# Patient Record
Sex: MALE | Race: OTHER | NOT HISPANIC OR LATINO | URBAN - METROPOLITAN AREA
[De-identification: names, ages, dates, MRNs, and addresses within clinical notes are randomized per-mention and may not be internally consistent; named-entity substitution may affect disease eponyms.]

---

## 2017-04-06 ENCOUNTER — OUTPATIENT (OUTPATIENT)
Dept: OUTPATIENT SERVICES | Facility: HOSPITAL | Age: 77
LOS: 1 days | Discharge: HOME | End: 2017-04-06

## 2017-06-27 DIAGNOSIS — E06.3 AUTOIMMUNE THYROIDITIS: ICD-10-CM

## 2017-06-27 DIAGNOSIS — E53.8 DEFICIENCY OF OTHER SPECIFIED B GROUP VITAMINS: ICD-10-CM

## 2017-06-27 DIAGNOSIS — D53.9 NUTRITIONAL ANEMIA, UNSPECIFIED: ICD-10-CM

## 2017-06-27 DIAGNOSIS — E55.9 VITAMIN D DEFICIENCY, UNSPECIFIED: ICD-10-CM

## 2017-10-20 ENCOUNTER — OUTPATIENT (OUTPATIENT)
Dept: OUTPATIENT SERVICES | Facility: HOSPITAL | Age: 77
LOS: 1 days | Discharge: HOME | End: 2017-10-20

## 2017-10-20 DIAGNOSIS — D61.818 OTHER PANCYTOPENIA: ICD-10-CM

## 2018-03-21 ENCOUNTER — INPATIENT (INPATIENT)
Facility: HOSPITAL | Age: 78
LOS: 4 days | Discharge: ANOTHER HOSPITAL | End: 2018-03-26
Attending: INTERNAL MEDICINE

## 2018-03-21 VITALS
TEMPERATURE: 102 F | SYSTOLIC BLOOD PRESSURE: 130 MMHG | RESPIRATION RATE: 18 BRPM | DIASTOLIC BLOOD PRESSURE: 63 MMHG | HEART RATE: 96 BPM

## 2018-03-21 DIAGNOSIS — Z96.652 PRESENCE OF LEFT ARTIFICIAL KNEE JOINT: Chronic | ICD-10-CM

## 2018-03-21 DIAGNOSIS — Z98.890 OTHER SPECIFIED POSTPROCEDURAL STATES: Chronic | ICD-10-CM

## 2018-03-21 LAB
ALBUMIN SERPL ELPH-MCNC: 2.9 G/DL — LOW (ref 3.5–5.2)
ALP SERPL-CCNC: 56 U/L — SIGNIFICANT CHANGE UP (ref 30–115)
ALT FLD-CCNC: 15 U/L — SIGNIFICANT CHANGE UP (ref 0–41)
ANION GAP SERPL CALC-SCNC: 11 MMOL/L — SIGNIFICANT CHANGE UP (ref 7–14)
ANISOCYTOSIS BLD QL: SIGNIFICANT CHANGE UP
APPEARANCE UR: CLEAR — SIGNIFICANT CHANGE UP
APTT BLD: 26.6 SEC — LOW (ref 27–39.2)
AST SERPL-CCNC: 15 U/L — SIGNIFICANT CHANGE UP (ref 0–41)
BACTERIA # UR AUTO: (no result) /HPF
BASE EXCESS BLDV CALC-SCNC: 4.2 MMOL/L — HIGH (ref -2–2)
BASOPHILS # BLD AUTO: 0 K/UL — SIGNIFICANT CHANGE UP (ref 0–0.2)
BASOPHILS NFR BLD AUTO: 0 % — SIGNIFICANT CHANGE UP (ref 0–1)
BILIRUB DIRECT SERPL-MCNC: 0.3 MG/DL — HIGH (ref 0–0.2)
BILIRUB INDIRECT FLD-MCNC: 0.6 MG/DL — SIGNIFICANT CHANGE UP (ref 0.2–1.2)
BILIRUB SERPL-MCNC: 0.9 MG/DL — SIGNIFICANT CHANGE UP (ref 0.2–1.2)
BILIRUB UR-MCNC: NEGATIVE — SIGNIFICANT CHANGE UP
BUN SERPL-MCNC: 13 MG/DL — SIGNIFICANT CHANGE UP (ref 10–20)
CALCIUM SERPL-MCNC: 7.9 MG/DL — LOW (ref 8.5–10.1)
CHLORIDE SERPL-SCNC: 100 MMOL/L — SIGNIFICANT CHANGE UP (ref 98–110)
CO2 SERPL-SCNC: 24 MMOL/L — SIGNIFICANT CHANGE UP (ref 17–32)
COLOR SPEC: YELLOW — SIGNIFICANT CHANGE UP
CREAT SERPL-MCNC: 0.7 MG/DL — SIGNIFICANT CHANGE UP (ref 0.7–1.5)
DIFF PNL FLD: NEGATIVE — SIGNIFICANT CHANGE UP
ELLIPTOCYTES BLD QL SMEAR: SLIGHT — SIGNIFICANT CHANGE UP
EOSINOPHIL # BLD AUTO: 0.01 K/UL — SIGNIFICANT CHANGE UP (ref 0–0.7)
EOSINOPHIL NFR BLD AUTO: 1.9 % — SIGNIFICANT CHANGE UP (ref 0–8)
GLUCOSE SERPL-MCNC: 100 MG/DL — SIGNIFICANT CHANGE UP (ref 70–110)
GLUCOSE UR QL: NEGATIVE — SIGNIFICANT CHANGE UP
HCO3 BLDV-SCNC: 27 MMOL/L — SIGNIFICANT CHANGE UP (ref 22–29)
HCT VFR BLD CALC: 24 % — LOW (ref 42–52)
HGB BLD-MCNC: 8.1 G/DL — LOW (ref 14–18)
HYPOCHROMIA BLD QL: SLIGHT — SIGNIFICANT CHANGE UP
IMM GRANULOCYTES NFR BLD AUTO: 0 % — LOW (ref 0.1–0.3)
INR BLD: 1.32 RATIO — HIGH (ref 0.65–1.3)
KETONES UR-MCNC: NEGATIVE — SIGNIFICANT CHANGE UP
LACTATE BLDV-MCNC: 1.1 MMOL/L — SIGNIFICANT CHANGE UP (ref 0.5–1.6)
LACTATE SERPL-SCNC: 1.1 MMOL/L — SIGNIFICANT CHANGE UP (ref 0.5–2.2)
LEUKOCYTE ESTERASE UR-ACNC: NEGATIVE — SIGNIFICANT CHANGE UP
LIDOCAIN IGE QN: 18 U/L — SIGNIFICANT CHANGE UP (ref 7–60)
LYMPHOCYTES # BLD AUTO: 0.42 K/UL — LOW (ref 1.2–3.4)
LYMPHOCYTES # BLD AUTO: 77.8 % — HIGH (ref 20.5–51.1)
MCHC RBC-ENTMCNC: 33.3 PG — HIGH (ref 27–31)
MCHC RBC-ENTMCNC: 33.8 G/DL — SIGNIFICANT CHANGE UP (ref 32–37)
MCV RBC AUTO: 98.8 FL — HIGH (ref 80–94)
MICROCYTES BLD QL: SLIGHT — SIGNIFICANT CHANGE UP
MONOCYTES # BLD AUTO: 0.03 K/UL — LOW (ref 0.1–0.6)
MONOCYTES NFR BLD AUTO: 5.6 % — SIGNIFICANT CHANGE UP (ref 1.7–9.3)
NEUTROPHILS # BLD AUTO: 0.08 K/UL — LOW (ref 1.4–6.5)
NEUTROPHILS NFR BLD AUTO: 14.7 % — LOW (ref 42.2–75.2)
NITRITE UR-MCNC: NEGATIVE — SIGNIFICANT CHANGE UP
NRBC # BLD: 0 /100 — SIGNIFICANT CHANGE UP (ref 0–0)
NRBC # BLD: 4 /100 WBCS — HIGH (ref 0–0)
PCO2 BLDV: 31 MMHG — LOW (ref 41–51)
PH BLDV: 7.54 — HIGH (ref 7.26–7.43)
PH UR: 6 — SIGNIFICANT CHANGE UP (ref 5–8)
PLAT MORPH BLD: SIGNIFICANT CHANGE UP
PLATELET # BLD AUTO: 203 K/UL — SIGNIFICANT CHANGE UP (ref 130–400)
PO2 BLDV: 26 MMHG — SIGNIFICANT CHANGE UP (ref 20–40)
POTASSIUM SERPL-MCNC: 3.8 MMOL/L — SIGNIFICANT CHANGE UP (ref 3.5–5)
POTASSIUM SERPL-SCNC: 3.8 MMOL/L — SIGNIFICANT CHANGE UP (ref 3.5–5)
PROT SERPL-MCNC: 5.4 G/DL — LOW (ref 6–8)
PROT UR-MCNC: 30
PROTHROM AB SERPL-ACNC: 14.3 SEC — HIGH (ref 9.95–12.87)
RBC # BLD: 2.43 M/UL — LOW (ref 4.7–6.1)
RBC # FLD: 19.1 % — HIGH (ref 11.5–14.5)
RBC BLD AUTO: (no result)
RBC CASTS # UR COMP ASSIST: SIGNIFICANT CHANGE UP /HPF
SAO2 % BLDV: 63 % — SIGNIFICANT CHANGE UP
SODIUM SERPL-SCNC: 135 MMOL/L — SIGNIFICANT CHANGE UP (ref 135–146)
SP GR SPEC: 1.02 — SIGNIFICANT CHANGE UP (ref 1.01–1.03)
UROBILINOGEN FLD QL: 0.2 — SIGNIFICANT CHANGE UP (ref 0.2–0.2)
VARIANT LYMPHS # BLD: 16 % — HIGH (ref 0–5)
WBC # BLD: 0.54 K/UL — CRITICAL LOW (ref 4.8–10.8)
WBC # FLD AUTO: 0.54 K/UL — CRITICAL LOW (ref 4.8–10.8)
WBC UR QL: SIGNIFICANT CHANGE UP /HPF

## 2018-03-21 RX ORDER — SORAFENIB 200 MG/1
0 TABLET, FILM COATED ORAL
Qty: 0 | Refills: 0 | COMMUNITY

## 2018-03-21 RX ORDER — PANTOPRAZOLE SODIUM 20 MG/1
0 TABLET, DELAYED RELEASE ORAL
Qty: 0 | Refills: 0 | COMMUNITY

## 2018-03-21 RX ORDER — ENOXAPARIN SODIUM 100 MG/ML
40 INJECTION SUBCUTANEOUS EVERY 24 HOURS
Qty: 0 | Refills: 0 | Status: DISCONTINUED | OUTPATIENT
Start: 2018-03-21 | End: 2018-03-26

## 2018-03-21 RX ORDER — SODIUM CHLORIDE 9 MG/ML
1000 INJECTION INTRAMUSCULAR; INTRAVENOUS; SUBCUTANEOUS
Qty: 0 | Refills: 0 | Status: DISCONTINUED | OUTPATIENT
Start: 2018-03-21 | End: 2018-03-22

## 2018-03-21 RX ORDER — SENNA PLUS 8.6 MG/1
2 TABLET ORAL AT BEDTIME
Qty: 0 | Refills: 0 | Status: DISCONTINUED | OUTPATIENT
Start: 2018-03-21 | End: 2018-03-25

## 2018-03-21 RX ORDER — CHOLECALCIFEROL (VITAMIN D3) 125 MCG
1000 CAPSULE ORAL DAILY
Qty: 0 | Refills: 0 | Status: DISCONTINUED | OUTPATIENT
Start: 2018-03-21 | End: 2018-03-26

## 2018-03-21 RX ORDER — DILTIAZEM HCL 120 MG
120 CAPSULE, EXT RELEASE 24 HR ORAL DAILY
Qty: 0 | Refills: 0 | Status: DISCONTINUED | OUTPATIENT
Start: 2018-03-21 | End: 2018-03-26

## 2018-03-21 RX ORDER — ACYCLOVIR SODIUM 500 MG
0 VIAL (EA) INTRAVENOUS
Qty: 0 | Refills: 0 | COMMUNITY

## 2018-03-21 RX ORDER — POSACONAZOLE 100 MG/1
3 TABLET, DELAYED RELEASE ORAL
Qty: 0 | Refills: 0 | COMMUNITY

## 2018-03-21 RX ORDER — CEFEPIME 1 G/1
2000 INJECTION, POWDER, FOR SOLUTION INTRAMUSCULAR; INTRAVENOUS EVERY 8 HOURS
Qty: 0 | Refills: 0 | Status: DISCONTINUED | OUTPATIENT
Start: 2018-03-21 | End: 2018-03-26

## 2018-03-21 RX ORDER — VANCOMYCIN HCL 1 G
1000 VIAL (EA) INTRAVENOUS ONCE
Qty: 0 | Refills: 0 | Status: COMPLETED | OUTPATIENT
Start: 2018-03-21 | End: 2018-03-21

## 2018-03-21 RX ORDER — ZOLPIDEM TARTRATE 10 MG/1
5 TABLET ORAL AT BEDTIME
Qty: 0 | Refills: 0 | Status: DISCONTINUED | OUTPATIENT
Start: 2018-03-21 | End: 2018-03-26

## 2018-03-21 RX ORDER — CABERGOLINE 0.5 MG/1
0 TABLET ORAL
Qty: 0 | Refills: 0 | COMMUNITY

## 2018-03-21 RX ORDER — FUROSEMIDE 40 MG
20 TABLET ORAL DAILY
Qty: 0 | Refills: 0 | Status: DISCONTINUED | OUTPATIENT
Start: 2018-03-21 | End: 2018-03-26

## 2018-03-21 RX ORDER — ACYCLOVIR SODIUM 500 MG
400 VIAL (EA) INTRAVENOUS
Qty: 0 | Refills: 0 | Status: DISCONTINUED | OUTPATIENT
Start: 2018-03-21 | End: 2018-03-26

## 2018-03-21 RX ORDER — SODIUM CHLORIDE 9 MG/ML
3 INJECTION INTRAMUSCULAR; INTRAVENOUS; SUBCUTANEOUS ONCE
Qty: 0 | Refills: 0 | Status: COMPLETED | OUTPATIENT
Start: 2018-03-21 | End: 2018-03-21

## 2018-03-21 RX ORDER — FUROSEMIDE 40 MG
0 TABLET ORAL
Qty: 0 | Refills: 0 | COMMUNITY

## 2018-03-21 RX ORDER — SORAFENIB 200 MG/1
200 TABLET, FILM COATED ORAL
Qty: 0 | Refills: 0 | Status: DISCONTINUED | OUTPATIENT
Start: 2018-03-21 | End: 2018-03-26

## 2018-03-21 RX ORDER — POSACONAZOLE 100 MG/1
300 TABLET, DELAYED RELEASE ORAL DAILY
Qty: 0 | Refills: 0 | Status: DISCONTINUED | OUTPATIENT
Start: 2018-03-21 | End: 2018-03-26

## 2018-03-21 RX ORDER — POSACONAZOLE 100 MG/1
0 TABLET, DELAYED RELEASE ORAL
Qty: 0 | Refills: 0 | COMMUNITY

## 2018-03-21 RX ORDER — PANTOPRAZOLE SODIUM 20 MG/1
40 TABLET, DELAYED RELEASE ORAL
Qty: 0 | Refills: 0 | Status: DISCONTINUED | OUTPATIENT
Start: 2018-03-21 | End: 2018-03-26

## 2018-03-21 RX ORDER — DOCUSATE SODIUM 100 MG
100 CAPSULE ORAL
Qty: 0 | Refills: 0 | Status: DISCONTINUED | OUTPATIENT
Start: 2018-03-21 | End: 2018-03-26

## 2018-03-21 RX ORDER — VANCOMYCIN HCL 1 G
1250 VIAL (EA) INTRAVENOUS EVERY 12 HOURS
Qty: 0 | Refills: 0 | Status: DISCONTINUED | OUTPATIENT
Start: 2018-03-21 | End: 2018-03-22

## 2018-03-21 RX ORDER — AZACITIDINE FOR 100 MG/1
0 INJECTION, POWDER, LYOPHILIZED, FOR SOLUTION INTRAVENOUS; SUBCUTANEOUS
Qty: 0 | Refills: 0 | COMMUNITY

## 2018-03-21 RX ORDER — CEFEPIME 1 G/1
1000 INJECTION, POWDER, FOR SOLUTION INTRAMUSCULAR; INTRAVENOUS ONCE
Qty: 0 | Refills: 0 | Status: COMPLETED | OUTPATIENT
Start: 2018-03-21 | End: 2018-03-21

## 2018-03-21 RX ADMIN — Medication 250 MILLIGRAM(S): at 16:30

## 2018-03-21 RX ADMIN — CEFEPIME 100 MILLIGRAM(S): 1 INJECTION, POWDER, FOR SOLUTION INTRAMUSCULAR; INTRAVENOUS at 17:00

## 2018-03-21 RX ADMIN — SODIUM CHLORIDE 100 MILLILITER(S): 9 INJECTION INTRAMUSCULAR; INTRAVENOUS; SUBCUTANEOUS at 17:10

## 2018-03-21 RX ADMIN — SODIUM CHLORIDE 3 MILLILITER(S): 9 INJECTION INTRAMUSCULAR; INTRAVENOUS; SUBCUTANEOUS at 17:00

## 2018-03-21 NOTE — ED ADULT NURSE NOTE - PMH
Afib    AML (acute myeloblastic leukemia)    CAD (coronary artery disease)    CHF (congestive heart failure)    HTN (hypertension)

## 2018-03-21 NOTE — ED PROVIDER NOTE - CARE PLAN
Principal Discharge DX:	Neutropenic fever  Secondary Diagnosis:	Acute myeloid leukemia not having achieved remission

## 2018-03-21 NOTE — H&P ADULT - NSHPREVIEWOFSYSTEMS_GEN_ALL_CORE
Review of Systems:   CONSTITUTIONAL: + fever, malaise, decreased appetite  EYES: No eye pain, visual disturbances, or discharge  ENMT:  No difficulty hearing, tinnitus, vertigo; No sinus or throat pain  NECK: No pain or stiffness  RESPIRATORY: No cough, wheezing, chills or hemoptysis; No shortness of breath  CARDIOVASCULAR: No chest pain, palpitations, dizziness, or leg swelling  GASTROINTESTINAL: No abdominal or epigastric pain. No nausea, vomiting, or hematemesis; + constipation  GENITOURINARY: No dysuria  NEUROLOGICAL: No headaches  SKIN: + PICC line

## 2018-03-21 NOTE — ED PROVIDER NOTE - PROGRESS NOTE DETAILS
Patient has AML, neutropenia and presented with fever.  He was transferred to  area to [facility], care transferred to Dr Bryant. signed out to dr. de jesus pending labs, treatment for neutropenic fever, fu with oncology team here nad patietns oncologist.

## 2018-03-21 NOTE — H&P ADULT - NSHPLABSRESULTS_GEN_ALL_CORE
T(C): 38 (18 @ 22:29), Max: 38.8 (18 @ 14:11)  T(F): 100.4 (18 @ 22:29), Max: 101.9 (18 @ 14:11)  HR: 74 (18 @ 22:29) (74 - 96)  BP: 132/63 (18 @ 22:29) (126/70 - 136/66)  RR: 18 (18 @ 22:29) (17 - 18)  SpO2: 97% (18 @ 19:50) (97% - 99%)  Labs:                                8.1<L>  0.54<LL>   )-----------(   203      ( 21 Mar 2018 16:03 )                     24.0<L>    Neutro%  14.7<L>   Lympho%  77.8<H>   Mono%    5.6     Bands    x            135  |  100  |  13  ----------------------------<  100  3.8   |  24  |  0.7    Ca    7.9<L>      21 Mar 2018 16:03    TPro  5.4<L>  /  Alb  2.9<L>  /  TBili  0.9  /  DBili  0.3<H>  /  AST  15  /  ALT  15  /  AlkPhos  56      eGFR if : 107 mL/min/1.73M2 (18 @ 16:03)  eGFR if Non African American: 92 mL/min/1.73M2 (18 @ 16:03)      ( 21 Mar 2018 16:03 )   PT: 14.30 sec;   INR: 1.32 ratio;       PTT:26.6 sec    Urinalysis Basic - ( 21 Mar 2018 16:03 )    Color: Yellow / Appearance: Clear / S.020 / pH: x  Gluc: x / Ketone: Negative  / Bili: Negative / Urobili: 0.2   Blood: x / Protein: 30 / Nitrite: Negative   Leuk Esterase: Negative / RBC: 1-2 /HPF / WBC 1-2 /HPF   Sq Epi: x / Non Sq Epi: x / Bacteria: Few /HPF      Venous Blood Gas:   @ 17:22  7.54/31///  VBG Lactate: 1.1    CXR: f/u official read  - no evidence of acute pathology

## 2018-03-21 NOTE — ED PROVIDER NOTE - MEDICAL DECISION MAKING DETAILS
hx of aml with chemo every 28 days for 7 days (victoza) and takes cerofenil daily. Oncologist at [facility], has picc line since oct. today went to clinic to have picc line pulled, had elevated temperature, found to be febrile, then resolved, febrile again so came here. he does have some constipation, but no abd pain, no vomiting, no other symptoms. on exam well appearing abd soft, right arm with picc line, plan is to evaluate for sepsis, blood work done today shows wbc of 0.9 making him neutropenic. Patient has neutropenic fever today so will admit, start on abx and discus with oncology here.

## 2018-03-21 NOTE — ED PROVIDER NOTE - PHYSICAL EXAMINATION
General: Patient sitting up in bed, mild distress  Resp: Lung sounds clear to auscultation  CVS: RRR, S1 S2 no murmurs noted  Abd: Soft, NT/ND  Extrems: warm, no edema  Skin: no wounds or ulcers. RUE PICC site c/d/i

## 2018-03-21 NOTE — H&P ADULT - ASSESSMENT
# neutropenic fever and history of AML on chemo  - c/w Vanco 1250mg q12 and cefepime 2g IV q8  - f/u blood culture  - consider taking out PICC line  - c/w sorafenib 200mg q12  - follow up Heme/onc and ID   - please obtain records from oncologist's office    # h/o Benign pituitary tumor  - c/w cabergoline 0.5mg qD    # CAD s/p stent  - per pt's wife, not on ASA, plavix and metoprolol    # A fib s/p ablation  - not on AC  - c/w Cardizem 120mg qD    # HFpEF (stable)  - wife will bring entresto tomorrow, please enter correct dosage and order medication  - c/w lasix 20mg qd    # Constipation  - c/w senna, colace  - on protonix 77 M with PMHx as listed presents for fever of 103 at home.    # neutropenic fever and history of AML on chemo  - c/w Vanco 1250mg q12 and cefepime 2g IV q8  - f/u blood culture  - consider taking out PICC line  - c/w sorafenib 200mg q12  - follow up Heme/onc and ID   - please obtain records from oncologist's office    # h/o Benign pituitary tumor  - c/w cabergoline 0.5mg qD    # CAD s/p stent  - per pt's wife, not on ASA, plavix and metoprolol    # A fib s/p ablation  - not on AC  - c/w Cardizem 120mg qD    # HFpEF (stable)  - wife will bring entresto tomorrow, please enter correct dosage and order medication  - c/w lasix 20mg qd    # Constipation  - c/w senna, colace  - on protonix

## 2018-03-21 NOTE — ED PROVIDER NOTE - OBJECTIVE STATEMENT
Albertina is a 78 y/o male w/ pmhx of AML on chemo (Victoza every 28 days for 7 days, cerofenil daily) care at [facility], current PICC line, past sepsis due to gut bacteria translocation, who presents to the ED for evaluation of fever. As per patient, went to clinic today for routine blood work. Was found to have fever so referred to ED. Has felt generalized malaise for past few days. + constipation. No other focal symptoms; denies cough, dysuria, abdominal pain, nausea/vomiting/diarrhea, pain/swelling around site of PICC line, or any other complaints.

## 2018-03-21 NOTE — H&P ADULT - NSHPPHYSICALEXAM_GEN_ALL_CORE
GENERAL: NAD, well-developed  HEAD:  Atraumatic, Normocephalic  EYES: EOMI, PERRLA, conjunctiva and sclera clear  NECK: Supple, No JVD  CHEST/LUNG: Clear to auscultation bilaterally; No wheeze  HEART: Regular rate and rhythm; No murmurs, rubs, or gallops  ABDOMEN: Soft, Nontender, Nondistended; Bowel sounds present  EXTREMITIES:  trace leg edema.   PSYCH: AAOx3  NEUROLOGY: non-focal  SKIN: PICC line c/d/i

## 2018-03-21 NOTE — H&P ADULT - ATTENDING COMMENTS
76 yo M with PMH of AML diagnosed Oct 2017 currently on chemotherapy, benign pituitary tumor, CAD s/p Stent (Jan 2017), CHF, a fib s/p ablation presents to the ED for fever of 103 at home. Pt has been having general malaise, decreased appetite and constipation for the past week. he denies, chest pain, cough, throat pain, headache, nausea, vomiting or diarrhea, no urinary symptoms. In the ED he was febrile, labs showed neuropenia ANC 80. CXR didn't show any consolidation or infiltrates, UA negative. patient admitted to the hospital for neutropenic fever.     Physical exam:   Alert, oriented x3, in no distress  HEENT: PERRLA, dry nasal mucosa with crusted mucosa.   Neck: no JVD, no LAD  Chest; clear, no rales or wheezing  Heart: RRR s1 s2, no murmurs  Abdomen: soft, non-tender. no edema  Ext; RUE PICC line in place, no signs of erythema or infection, LE normal, pulses are ok b/l.     A/P:   Neutropenic fever: from   AML: s/p recent chemotherapy.   Start on Vancomycin and Cefepime IV, send blood culture from peripheral veins and PICC line.   ID consult. Tylenol prn.   Oncology consult, Hold Sorafenib for now  Contact his Oncologist at [facility] for further information.     CAD: stable s/p PCI  continue with ASA, Plavix and Metoprolol . Not on Statin, will discuss with the patient.

## 2018-03-21 NOTE — ED ADULT NURSE NOTE - OBJECTIVE STATEMENT
Patient was found to be febrile at [facility] were patient was having routine labs and PICC line dressing done. Patient was found to be febrile at [facility] were patient was having routine labs and PICC line dressing done. Patient found to be febrile at 102 oral at home. Patient has no other complaints at this time.

## 2018-03-21 NOTE — H&P ADULT - PMH
Afib    AML (acute myeloblastic leukemia)    CAD (coronary artery disease)    CHF (congestive heart failure)    HTN (hypertension) Afib    AML (acute myeloblastic leukemia)    CAD (coronary artery disease)    CHF (congestive heart failure)    HTN (hypertension)    Pituitary tumor  benign

## 2018-03-21 NOTE — H&P ADULT - HISTORY OF PRESENT ILLNESS
T max of 101.9 in ED 78 yo M with PMHx of 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 Pt was at [facility] clinic for routine blood draw the day of presentation, was found to have temp of 100.9 initially and then normalized to 98, so the clinic did not send pt to the ED. Pt went home and retook temperature which showed Temp of 103, so pt decided to come to the hospital. Pt has been having general malaise, decreased appetite and constipation for the past week. Pt had a history of sepsis 2/2 gut bacteria translocation one month after he was diagnosed with AML and pt had prolonged constipation at that time.   Pt had a bowel movement in the ED. Denies SOB, CP, cough, abdominal pain,  symptoms, leg edema.     Pt currently has a PICC line for blood and chemo infusion. site looks clean/dry/ intact.   T max of 101.9 in ED, was given IV Vancomycin 1000mg and cefepime 1000mg X1. 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 Pt was at [facility] clinic for routine blood draw the day of presentation, was found to have temp of 100.9 initially and then normalized to 98, so the clinic did not send pt to the ED. Pt went home and retook temperature which showed Temp of 103, so pt decided to come to the hospital. Pt has been having general malaise, decreased appetite and constipation for the past week. Per pt, he had sepsis 2/2 gut bacteria translocation one month after he was diagnosed with AML and he prolonged constipation at that time.   Pt had a bowel movement in the ED. Denies SOB, CP, cough, abdominal pain,  symptoms, leg edema.     Pt currently has a PICC line for blood and chemo infusion. site looks clean/dry/ intact.   T max of 101.9 in ED, was given IV Vancomycin 1000mg and cefepime 1000mg X1.

## 2018-03-22 LAB
-  STREPTOCOCCUS SP. (NOT GRP A, B OR S PNEUMONIAE): SIGNIFICANT CHANGE UP
ANION GAP SERPL CALC-SCNC: 10 MMOL/L — SIGNIFICANT CHANGE UP (ref 7–14)
BASOPHILS # BLD AUTO: 0 K/UL — SIGNIFICANT CHANGE UP (ref 0–0.2)
BASOPHILS NFR BLD AUTO: 0 % — SIGNIFICANT CHANGE UP (ref 0–1)
BUN SERPL-MCNC: 9 MG/DL — LOW (ref 10–20)
CALCIUM SERPL-MCNC: 7.6 MG/DL — LOW (ref 8.5–10.1)
CHLORIDE SERPL-SCNC: 100 MMOL/L — SIGNIFICANT CHANGE UP (ref 98–110)
CO2 SERPL-SCNC: 23 MMOL/L — SIGNIFICANT CHANGE UP (ref 17–32)
CREAT SERPL-MCNC: 0.6 MG/DL — LOW (ref 0.7–1.5)
CULTURE RESULTS: NO GROWTH — SIGNIFICANT CHANGE UP
EOSINOPHIL # BLD AUTO: 0 K/UL — SIGNIFICANT CHANGE UP (ref 0–0.7)
EOSINOPHIL NFR BLD AUTO: 0 % — SIGNIFICANT CHANGE UP (ref 0–8)
GLUCOSE SERPL-MCNC: 94 MG/DL — SIGNIFICANT CHANGE UP (ref 70–110)
GRAM STN FLD: SIGNIFICANT CHANGE UP
GRAM STN FLD: SIGNIFICANT CHANGE UP
HCT VFR BLD CALC: 22.3 % — LOW (ref 42–52)
HGB BLD-MCNC: 7.6 G/DL — LOW (ref 14–18)
IMM GRANULOCYTES NFR BLD AUTO: 0 % — LOW (ref 0.1–0.3)
LYMPHOCYTES # BLD AUTO: 0.5 K/UL — LOW (ref 1.2–3.4)
LYMPHOCYTES # BLD AUTO: 80.6 % — HIGH (ref 20.5–51.1)
MCHC RBC-ENTMCNC: 33.5 PG — HIGH (ref 27–31)
MCHC RBC-ENTMCNC: 34.1 G/DL — SIGNIFICANT CHANGE UP (ref 32–37)
MCV RBC AUTO: 98.2 FL — HIGH (ref 80–94)
METHOD TYPE: SIGNIFICANT CHANGE UP
MONOCYTES # BLD AUTO: 0.01 K/UL — LOW (ref 0.1–0.6)
MONOCYTES NFR BLD AUTO: 1.6 % — LOW (ref 1.7–9.3)
NEUTROPHILS # BLD AUTO: 0.11 K/UL — LOW (ref 1.4–6.5)
NEUTROPHILS NFR BLD AUTO: 17.8 % — LOW (ref 42.2–75.2)
PLATELET # BLD AUTO: 204 K/UL — SIGNIFICANT CHANGE UP (ref 130–400)
POTASSIUM SERPL-MCNC: 3.6 MMOL/L — SIGNIFICANT CHANGE UP (ref 3.5–5)
POTASSIUM SERPL-SCNC: 3.6 MMOL/L — SIGNIFICANT CHANGE UP (ref 3.5–5)
RBC # BLD: 2.27 M/UL — LOW (ref 4.7–6.1)
RBC # FLD: 19.1 % — HIGH (ref 11.5–14.5)
SODIUM SERPL-SCNC: 133 MMOL/L — LOW (ref 135–146)
SPECIMEN SOURCE: SIGNIFICANT CHANGE UP
SPECIMEN SOURCE: SIGNIFICANT CHANGE UP
WBC # BLD: 0.62 K/UL — CRITICAL LOW (ref 4.8–10.8)
WBC # FLD AUTO: 0.62 K/UL — CRITICAL LOW (ref 4.8–10.8)

## 2018-03-22 RX ORDER — ACETAMINOPHEN 500 MG
650 TABLET ORAL EVERY 6 HOURS
Qty: 0 | Refills: 0 | Status: DISCONTINUED | OUTPATIENT
Start: 2018-03-22 | End: 2018-03-26

## 2018-03-22 RX ORDER — ATORVASTATIN CALCIUM 80 MG/1
40 TABLET, FILM COATED ORAL AT BEDTIME
Qty: 0 | Refills: 0 | Status: DISCONTINUED | OUTPATIENT
Start: 2018-03-22 | End: 2018-03-26

## 2018-03-22 RX ORDER — DAPTOMYCIN 500 MG/10ML
900 INJECTION, POWDER, LYOPHILIZED, FOR SOLUTION INTRAVENOUS DAILY
Qty: 0 | Refills: 0 | Status: COMPLETED | OUTPATIENT
Start: 2018-03-23 | End: 2018-03-23

## 2018-03-22 RX ORDER — DAPTOMYCIN 500 MG/10ML
900 INJECTION, POWDER, LYOPHILIZED, FOR SOLUTION INTRAVENOUS ONCE
Qty: 0 | Refills: 0 | Status: COMPLETED | OUTPATIENT
Start: 2018-03-22 | End: 2018-03-22

## 2018-03-22 RX ORDER — CABERGOLINE 0.5 MG/1
0.5 TABLET ORAL DAILY
Qty: 0 | Refills: 0 | Status: DISCONTINUED | OUTPATIENT
Start: 2018-03-22 | End: 2018-03-26

## 2018-03-22 RX ORDER — SACUBITRIL AND VALSARTAN 24; 26 MG/1; MG/1
1 TABLET, FILM COATED ORAL EVERY 12 HOURS
Qty: 0 | Refills: 0 | Status: DISCONTINUED | OUTPATIENT
Start: 2018-03-22 | End: 2018-03-26

## 2018-03-22 RX ADMIN — ZOLPIDEM TARTRATE 5 MILLIGRAM(S): 10 TABLET ORAL at 23:03

## 2018-03-22 RX ADMIN — CEFEPIME 100 MILLIGRAM(S): 1 INJECTION, POWDER, FOR SOLUTION INTRAMUSCULAR; INTRAVENOUS at 21:35

## 2018-03-22 RX ADMIN — Medication 100 MILLIGRAM(S): at 05:20

## 2018-03-22 RX ADMIN — Medication 166.67 MILLIGRAM(S): at 05:19

## 2018-03-22 RX ADMIN — Medication 1000 UNIT(S): at 11:35

## 2018-03-22 RX ADMIN — DAPTOMYCIN 236 MILLIGRAM(S): 500 INJECTION, POWDER, LYOPHILIZED, FOR SOLUTION INTRAVENOUS at 16:52

## 2018-03-22 RX ADMIN — SACUBITRIL AND VALSARTAN 1 TABLET(S): 24; 26 TABLET, FILM COATED ORAL at 21:40

## 2018-03-22 RX ADMIN — SORAFENIB 200 MILLIGRAM(S): 200 TABLET, FILM COATED ORAL at 21:42

## 2018-03-22 RX ADMIN — Medication 100 MILLIGRAM(S): at 18:34

## 2018-03-22 RX ADMIN — ATORVASTATIN CALCIUM 40 MILLIGRAM(S): 80 TABLET, FILM COATED ORAL at 21:37

## 2018-03-22 RX ADMIN — Medication 650 MILLIGRAM(S): at 13:50

## 2018-03-22 RX ADMIN — Medication 120 MILLIGRAM(S): at 05:21

## 2018-03-22 RX ADMIN — PANTOPRAZOLE SODIUM 40 MILLIGRAM(S): 20 TABLET, DELAYED RELEASE ORAL at 05:23

## 2018-03-22 RX ADMIN — Medication 650 MILLIGRAM(S): at 19:31

## 2018-03-22 RX ADMIN — ZOLPIDEM TARTRATE 5 MILLIGRAM(S): 10 TABLET ORAL at 00:15

## 2018-03-22 RX ADMIN — SENNA PLUS 2 TABLET(S): 8.6 TABLET ORAL at 21:37

## 2018-03-22 RX ADMIN — ENOXAPARIN SODIUM 40 MILLIGRAM(S): 100 INJECTION SUBCUTANEOUS at 11:35

## 2018-03-22 RX ADMIN — CABERGOLINE 0.5 MILLIGRAM(S): 0.5 TABLET ORAL at 21:39

## 2018-03-22 RX ADMIN — Medication 400 MILLIGRAM(S): at 18:34

## 2018-03-22 RX ADMIN — Medication 20 MILLIGRAM(S): at 05:22

## 2018-03-22 RX ADMIN — Medication 400 MILLIGRAM(S): at 05:21

## 2018-03-22 RX ADMIN — CEFEPIME 100 MILLIGRAM(S): 1 INJECTION, POWDER, FOR SOLUTION INTRAMUSCULAR; INTRAVENOUS at 13:22

## 2018-03-22 RX ADMIN — CEFEPIME 100 MILLIGRAM(S): 1 INJECTION, POWDER, FOR SOLUTION INTRAMUSCULAR; INTRAVENOUS at 05:20

## 2018-03-22 NOTE — PROGRESS NOTE ADULT - SUBJECTIVE AND OBJECTIVE BOX
76 yo M with PMHx of AML diagnosed Oct 2017 currently on chemo (Vidaza every 28 days over 7 days, last dose ended on  and Sorafenib daily), benign pituitary tumor, CAD s/p Stent (2017), CHF, a fib s/p ablation presents to the ED for fever of 103 at home. Pt was at [facility] clinic for routine blood draw the day of presentation, was found to have temp of 100.9 initially and then normalized to 98, so the clinic did not send pt to the ED. Pt went home and retook temperature which showed Temp of 103, so pt decided to come to the hospital. Pt has been having general malaise, decreased appetite and constipation for the past week. Per pt, he had sepsis 2/2 gut bacteria translocation one month after he was diagnosed with AML and he prolonged constipation at that time.   Pt had a bowel movement in the ED. Denies SOB, CP, cough, abdominal pain,  symptoms, leg edema.     Pt currently has a PICC line for blood and chemo infusion. site looks clean/dry/ intact.   T max of 101.9 in ED, was given IV Vancomycin 1000mg and cefepime 1000mg X1.    PMH & PSH  Afib    AML (acute myeloblastic leukemia)    CAD (coronary artery disease) s/p stent in proximal LAD  CHF (congestive heart failure)    HTN (hypertension)    Pituitary tumor  benign.  H/O total knee replacement, left    History of cardiac radiofrequency ablation.    Home Medications:  acyclovir: 400  orally 2 times a day (21 Mar 2018 23:38)  Ambien 5 mg oral tablet: 1 tab(s) orally once a day (at bedtime) (21 Mar 2018 23:38)  cabergoline 0.5 mg oral tablet: 1 tab(s) orally once a day (21 Mar 2018 23:38)  Cardizem  mg/24 hours oral capsule, extended release: 1 cap(s) orally once a day (21 Mar 2018 23:38)  Entresto:  (21 Mar 2018 23:38)  Lasix 20 mg oral tablet: 1 tab(s) orally once a day (21 Mar 2018 23:38)  Noxafil 100 mg oral delayed release tablet: 3 tab(s) orally once a day (21 Mar 2018 23:38)  Protonix 40 mg oral delayed release tablet: 1 tab(s) orally once a day (21 Mar 2018 23:38)  SORAfenib 200 mg oral tablet: 2 tab(s) orally 2 times a day (21 Mar 2018 23:38)  Vidaza: every 28 days for 7 days. last dose  (21 Mar 2018 23:38)  Vitamin D3 1000 intl units oral capsule: 1 cap(s) orally once a day (21 Mar 2018 23:38)    MEDICATIONS  (STANDING):  acyclovir   Tablet 400 milliGRAM(s) Oral two times a day  cabergoline 0.5 milliGRAM(s) Oral daily  cefepime  IVPB 2000 milliGRAM(s) IV Intermittent every 8 hours  cholecalciferol 1000 Unit(s) Oral daily  DAPTOmycin IVPB 900 milliGRAM(s) IV Intermittent once  diltiazem    milliGRAM(s) Oral daily  docusate sodium 100 milliGRAM(s) Oral two times a day  enoxaparin Injectable 40 milliGRAM(s) SubCutaneous every 24 hours  furosemide    Tablet 20 milliGRAM(s) Oral daily  pantoprazole    Tablet 40 milliGRAM(s) Oral before breakfast  posaconazole DR Tablet 300 milliGRAM(s) Oral daily  senna 2 Tablet(s) Oral at bedtime  SORAfenib 200 milliGRAM(s) Oral two times a day    MEDICATIONS  (PRN):  acetaminophen   Tablet 650 milliGRAM(s) Oral every 6 hours PRN For Temp greater than 38 C (100.4 F)  zolpidem 5 milliGRAM(s) Oral at bedtime PRN Insomnia    Vital Signs Last 24 Hrs  T(C): 38 (22 Mar 2018 07:47), Max: 38 (21 Mar 2018 22:29)  T(F): 100.4 (22 Mar 2018 07:47), Max: 100.4 (21 Mar 2018 22:29)  HR: 78 (22 Mar 2018 14:47) (74 - 92)  BP: 151/72 (22 Mar 2018 14:47) (119/69 - 151/72)  BP(mean): --  RR: 18 (22 Mar 2018 05:27) (17 - 18)  SpO2: 96% (21 Mar 2018 22:40) (96% - 99%)    Physical exam  GENERAL: NAD, well-developed  HEAD:  Atraumatic, Normocephalic  EYES: EOMI, PERRLA, conjunctiva and sclera clear  NECK: Supple, No JVD  CHEST/LUNG: Clear to auscultation bilaterally; No wheeze  HEART: Regular rate and rhythm; No murmurs, rubs, or gallops  ABDOMEN: Soft, Nontender, Nondistended; Bowel sounds present  EXTREMITIES:  trace leg edema.   PSYCH: AAOx3  NEUROLOGY: non-focal  SKIN: PICC line c/d/i    Labs  CBC Full  -  ( 22 Mar 2018 10:12 )  WBC Count : 0.62 K/uL  Hemoglobin : 7.6 g/dL  Hematocrit : 22.3 %  Platelet Count - Automated : 204 K/uL  Mean Cell Volume : 98.2 fL  Mean Cell Hemoglobin : 33.5 pg  Mean Cell Hemoglobin Concentration : 34.1 g/dL  Auto Neutrophil # : 0.11 K/uL  Auto Lymphocyte # : 0.50 K/uL  Auto Monocyte # : 0.01 K/uL  Auto Eosinophil # : 0.00 K/uL  Auto Basophil # : 0.00 K/uL  Auto Neutrophil % : 17.8 %  Auto Lymphocyte % : 80.6 %  Auto Monocyte % : 1.6 %  Auto Eosinophil % : 0.0 %  Auto Basophil % : 0.0 %        133<L>  |  100  |  9<L>  ----------------------------<  94  3.6   |  23  |  0.6<L>    Ca    7.6<L>      22 Mar 2018 10:12  TPro  5.4<L>  /  Alb  2.9<L>  /  TBili  0.9  /  DBili  0.3<H>  /  AST  15  /  ALT  15  /  AlkPhos  56    PT/INR - ( 21 Mar 2018 16:03 )   PT: 14.30 sec;   INR: 1.32 ratio    PTT - ( 21 Mar 2018 16:03 )  PTT:26.6 sec    Urinalysis Basic - ( 21 Mar 2018 16:03 )    	Color: Yellow / Appearance: Clear / S.020 / pH: x  	Gluc: x / Ketone: Negative  / Bili: Negative / Urobili: 0.2   	Blood: x / Protein: 30 / Nitrite: Negative   	Leuk Esterase: Negative / RBC: 1-2 /HPF / WBC 1-2 /HPF   	Sq Epi: x / Non Sq Epi: x / Bacteria: Few /HPF    Venous Blood Gas:  	 @ 17:22  	7.54/31/26/27/63  	VBG Lactate: 1.1    < from: Xray Chest 1 View-PORTABLE IMMEDIATE (18 @ 16:52) >  Increased opacities at the medial right lung base which may be on the   basis of prominent vasculature - congestion/mild edema although limited   in evaluation secondary to rotation.    < end of copied text >

## 2018-03-22 NOTE — PROGRESS NOTE ADULT - ASSESSMENT
Neutropenic fever and history of AML on chemo  - ID: Resume Cefepime, add Daptomycin 900 daily, resume Posaconazole  - f/u blood cultures from peripheral and PICC line  - PICC line removed  - c/w sorafenib 200mg q12 according to Dr Corrigan at [facility] ([phone])  - T 100.4F, given Tylenol. Follow up ANC for 2 more days (currently 110 from 80). If still has LGF and ANC not improving, follow up with Oncologist at [facility] for transfer.    h/o Benign pituitary tumor  - c/w cabergoline 0.5mg qD    CAD s/p stent  - per pt's wife, not on ASA, plavix and metoprolol    A fib s/p ablation  - not on AC  - c/w Cardizem 120mg qD    HFpEF (stable)  - Entresto and lasix 20mg qd    Constipation; had a bowel movement yesterday, c/w senna, colace, +/- dulcolax    DVT ppx; Lovenox  GI ppx; Protonix

## 2018-03-22 NOTE — CONSULT NOTE ADULT - SUBJECTIVE AND OBJECTIVE BOX
JILL JAEGER  77y, Male  Allergy: No Known Allergies      HPI:  78 yo M with PMHx of AML diagnosed Oct 2017 currently on chemo (Vidaza every 28 days over 7 days, last dose ended on  and Sorafenib daily), benign pituitary tumor, CAD s/p Stent (2017), CHF, a fib s/p ablation presents to the ED for fever of 103 at home. Pt was at [facility] clinic for routine blood draw the day of presentation, was found to have temp of 100.9 initially and then normalized to 98, so the clinic did not send pt to the ED. Pt went home and retook temperature which showed Temp of 103, so pt decided to come to the hospital. Pt has been having general malaise, decreased appetite and constipation for the past week. Per pt, he had sepsis 2/2 gut bacteria translocation one month after he was diagnosed with AML and he prolonged constipation at that time.   Pt had a bowel movement in the ED. Denies SOB, CP, cough, abdominal pain,  symptoms, leg edema.     Pt currently has a PICC line for blood and chemo infusion. site looks clean/dry/ intact.   T max of 101.9 in ED, was given IV Vancomycin 1000mg and cefepime 1000mg X1. (21 Mar 2018 22:16)    FAMILY HISTORY:  Family history of breast cancer (Mother)    PAST MEDICAL & SURGICAL HISTORY:  Pituitary tumor: benign  CHF (congestive heart failure)  HTN (hypertension)  Afib  CAD (coronary artery disease)  AML (acute myeloblastic leukemia)  H/O total knee replacement, left  History of cardiac radiofrequency ablation        VITALS:  T(F): 100.4, Max: 100.4 (18 @ 22:29)  HR: 78  BP: 151/72  RR: 18Vital Signs Last 24 Hrs  T(C): 38 (22 Mar 2018 07:47), Max: 38 (21 Mar 2018 22:29)  T(F): 100.4 (22 Mar 2018 07:47), Max: 100.4 (21 Mar 2018 22:29)  HR: 78 (22 Mar 2018 14:47) (74 - 92)  BP: 151/72 (22 Mar 2018 14:47) (119/69 - 151/72)  BP(mean): --  RR: 18 (22 Mar 2018 05:27) (17 - 18)  SpO2: 96% (21 Mar 2018 22:40) (96% - 99%)    TESTS & MEASUREMENTS:                        7.6    0.62  )-----------( 204      ( 22 Mar 2018 10:12 )             22.3         133<L>  |  100  |  9<L>  ----------------------------<  94  3.6   |  23  |  0.6<L>    Ca    7.6<L>      22 Mar 2018 10:12    TPro  5.4<L>  /  Alb  2.9<L>  /  TBili  0.9  /  DBili  0.3<H>  /  AST  15  /  ALT  15  /  AlkPhos  56      LIVER FUNCTIONS - ( 21 Mar 2018 16:03 )  Alb: 2.9 g/dL / Pro: 5.4 g/dL / ALK PHOS: 56 U/L / ALT: 15 U/L / AST: 15 U/L / GGT: x             Culture - Blood (collected 18 @ 16:03)  Source: .Blood Blood-Peripheral  Gram Stain (18 @ 13:53):    Growth in aerobic and anaerobic bottles: Gram Positive Cocci in Pairs and    Chains  Preliminary Report (18 @ 13:54):    Growth in aerobic and anaerobic bottles: Gram Positive Cocci in Pairs and    Chains    "Due to technical problems, Proteus sp. will Not be reported as part of    the BCID panel until further notice" ***Blood Panel PCR results on this    specimen are available    approximately 3 hours after the Gram stain result.***    Gram stain, PCR, and/or culture results may not always    correspond due to difference in methodologies.    ************************************************************    This PCR assay was performed using [employer].    The following targets are tested for: Enterococcus,    vancomycin resistant enterococci, Listeria monocytogenes,    coagulase negative staphylococci, S. aureus,    methicillin resistant S. aureus, Streptococcus agalactiae    (Group B), S. pneumoniae, S. pyogenes (Group A),    Acinetobacter baumannii, Enterobacter cloacae, E. coli,    Klebsiella oxytoca, K. pneumoniae, Proteus sp.,    Serratia marcescens, Haemophilus influenzae,    Neisseria meningitidis, Pseudomonas aeruginosa, Candida    albicans, C. glabrata, C krusei, C parapsilosis,    C. tropicalis and the KPC resistance gene.  Organism: Blood Culture PCR (18 @ 15:13)  Organism: Blood Culture PCR (18 @ 15:13)      -  Streptococcus sp. (Not Grp A, B or S pneumoniae): Detec      Method Type: PCR      Urinalysis Basic - ( 21 Mar 2018 16:03 )    Color: Yellow / Appearance: Clear / S.020 / pH: x  Gluc: x / Ketone: Negative  / Bili: Negative / Urobili: 0.2   Blood: x / Protein: 30 / Nitrite: Negative   Leuk Esterase: Negative / RBC: 1-2 /HPF / WBC 1-2 /HPF   Sq Epi: x / Non Sq Epi: x / Bacteria: Few /HPF          RADIOLOGY & ADDITIONAL TESTS:    ANTIBIOTICS:  acyclovir   Tablet 400 milliGRAM(s) Oral two times a day  cefepime  IVPB 2000 milliGRAM(s) IV Intermittent every 8 hours  DAPTOmycin IVPB 900 milliGRAM(s) IV Intermittent once  posaconazole DR Tablet 300 milliGRAM(s) Oral daily

## 2018-03-23 LAB
ALBUMIN SERPL ELPH-MCNC: 3 G/DL — LOW (ref 3.5–5.2)
ALP SERPL-CCNC: 61 U/L — SIGNIFICANT CHANGE UP (ref 30–115)
ALT FLD-CCNC: 25 U/L — SIGNIFICANT CHANGE UP (ref 0–41)
ANION GAP SERPL CALC-SCNC: 13 MMOL/L — SIGNIFICANT CHANGE UP (ref 7–14)
AST SERPL-CCNC: 19 U/L — SIGNIFICANT CHANGE UP (ref 0–41)
BASOPHILS # BLD AUTO: 0 K/UL — SIGNIFICANT CHANGE UP (ref 0–0.2)
BASOPHILS NFR BLD AUTO: 0 % — SIGNIFICANT CHANGE UP (ref 0–1)
BILIRUB SERPL-MCNC: 0.7 MG/DL — SIGNIFICANT CHANGE UP (ref 0.2–1.2)
BUN SERPL-MCNC: 10 MG/DL — SIGNIFICANT CHANGE UP (ref 10–20)
CALCIUM SERPL-MCNC: 7.9 MG/DL — LOW (ref 8.5–10.1)
CHLORIDE SERPL-SCNC: 102 MMOL/L — SIGNIFICANT CHANGE UP (ref 98–110)
CO2 SERPL-SCNC: 24 MMOL/L — SIGNIFICANT CHANGE UP (ref 17–32)
CREAT SERPL-MCNC: 0.7 MG/DL — SIGNIFICANT CHANGE UP (ref 0.7–1.5)
CULTURE RESULTS: SIGNIFICANT CHANGE UP
EOSINOPHIL # BLD AUTO: 0 K/UL — SIGNIFICANT CHANGE UP (ref 0–0.7)
EOSINOPHIL NFR BLD AUTO: 0 % — SIGNIFICANT CHANGE UP (ref 0–8)
GLUCOSE SERPL-MCNC: 133 MG/DL — HIGH (ref 70–110)
HCT VFR BLD CALC: 24.2 % — LOW (ref 42–52)
HGB BLD-MCNC: 8.2 G/DL — LOW (ref 14–18)
IMM GRANULOCYTES NFR BLD AUTO: 1.3 % — HIGH (ref 0.1–0.3)
LYMPHOCYTES # BLD AUTO: 0.6 K/UL — LOW (ref 1.2–3.4)
LYMPHOCYTES # BLD AUTO: 77.9 % — HIGH (ref 20.5–51.1)
MAGNESIUM SERPL-MCNC: 1.9 MG/DL — SIGNIFICANT CHANGE UP (ref 1.8–2.4)
MCHC RBC-ENTMCNC: 33.6 PG — HIGH (ref 27–31)
MCHC RBC-ENTMCNC: 33.9 G/DL — SIGNIFICANT CHANGE UP (ref 32–37)
MCV RBC AUTO: 99.2 FL — HIGH (ref 80–94)
MONOCYTES # BLD AUTO: 0.01 K/UL — LOW (ref 0.1–0.6)
MONOCYTES NFR BLD AUTO: 1.3 % — LOW (ref 1.7–9.3)
NEUTROPHILS # BLD AUTO: 0.15 K/UL — LOW (ref 1.4–6.5)
NEUTROPHILS NFR BLD AUTO: 19.5 % — LOW (ref 42.2–75.2)
NRBC # BLD: 0 /100 WBCS — SIGNIFICANT CHANGE UP (ref 0–0)
ORGANISM # SPEC MICROSCOPIC CNT: SIGNIFICANT CHANGE UP
ORGANISM # SPEC MICROSCOPIC CNT: SIGNIFICANT CHANGE UP
PLATELET # BLD AUTO: 224 K/UL — SIGNIFICANT CHANGE UP (ref 130–400)
POTASSIUM SERPL-MCNC: 3.4 MMOL/L — LOW (ref 3.5–5)
POTASSIUM SERPL-SCNC: 3.4 MMOL/L — LOW (ref 3.5–5)
PROT SERPL-MCNC: 5.5 G/DL — LOW (ref 6–8)
RBC # BLD: 2.44 M/UL — LOW (ref 4.7–6.1)
RBC # FLD: 18.8 % — HIGH (ref 11.5–14.5)
SODIUM SERPL-SCNC: 139 MMOL/L — SIGNIFICANT CHANGE UP (ref 135–146)
SPECIMEN SOURCE: SIGNIFICANT CHANGE UP
WBC # BLD: 0.77 K/UL — CRITICAL LOW (ref 4.8–10.8)
WBC # FLD AUTO: 0.77 K/UL — CRITICAL LOW (ref 4.8–10.8)

## 2018-03-23 RX ORDER — POTASSIUM CHLORIDE 20 MEQ
40 PACKET (EA) ORAL ONCE
Qty: 0 | Refills: 0 | Status: COMPLETED | OUTPATIENT
Start: 2018-03-23 | End: 2018-03-23

## 2018-03-23 RX ADMIN — Medication 40 MILLIEQUIVALENT(S): at 17:35

## 2018-03-23 RX ADMIN — POSACONAZOLE 300 MILLIGRAM(S): 100 TABLET, DELAYED RELEASE ORAL at 11:12

## 2018-03-23 RX ADMIN — SORAFENIB 200 MILLIGRAM(S): 200 TABLET, FILM COATED ORAL at 22:04

## 2018-03-23 RX ADMIN — ZOLPIDEM TARTRATE 5 MILLIGRAM(S): 10 TABLET ORAL at 22:05

## 2018-03-23 RX ADMIN — CEFEPIME 100 MILLIGRAM(S): 1 INJECTION, POWDER, FOR SOLUTION INTRAMUSCULAR; INTRAVENOUS at 13:19

## 2018-03-23 RX ADMIN — Medication 650 MILLIGRAM(S): at 05:34

## 2018-03-23 RX ADMIN — Medication 400 MILLIGRAM(S): at 05:34

## 2018-03-23 RX ADMIN — Medication 650 MILLIGRAM(S): at 18:52

## 2018-03-23 RX ADMIN — DAPTOMYCIN 236 MILLIGRAM(S): 500 INJECTION, POWDER, LYOPHILIZED, FOR SOLUTION INTRAVENOUS at 11:15

## 2018-03-23 RX ADMIN — Medication 20 MILLIGRAM(S): at 05:35

## 2018-03-23 RX ADMIN — Medication 400 MILLIGRAM(S): at 17:47

## 2018-03-23 RX ADMIN — SORAFENIB 200 MILLIGRAM(S): 200 TABLET, FILM COATED ORAL at 12:34

## 2018-03-23 RX ADMIN — SACUBITRIL AND VALSARTAN 1 TABLET(S): 24; 26 TABLET, FILM COATED ORAL at 17:39

## 2018-03-23 RX ADMIN — Medication 120 MILLIGRAM(S): at 05:34

## 2018-03-23 RX ADMIN — ATORVASTATIN CALCIUM 40 MILLIGRAM(S): 80 TABLET, FILM COATED ORAL at 21:31

## 2018-03-23 RX ADMIN — Medication 100 MILLIGRAM(S): at 05:35

## 2018-03-23 RX ADMIN — PANTOPRAZOLE SODIUM 40 MILLIGRAM(S): 20 TABLET, DELAYED RELEASE ORAL at 05:36

## 2018-03-23 RX ADMIN — CEFEPIME 100 MILLIGRAM(S): 1 INJECTION, POWDER, FOR SOLUTION INTRAMUSCULAR; INTRAVENOUS at 05:35

## 2018-03-23 RX ADMIN — Medication 650 MILLIGRAM(S): at 12:05

## 2018-03-23 RX ADMIN — Medication 100 MILLIGRAM(S): at 17:35

## 2018-03-23 RX ADMIN — ENOXAPARIN SODIUM 40 MILLIGRAM(S): 100 INJECTION SUBCUTANEOUS at 11:11

## 2018-03-23 RX ADMIN — SACUBITRIL AND VALSARTAN 1 TABLET(S): 24; 26 TABLET, FILM COATED ORAL at 05:33

## 2018-03-23 RX ADMIN — Medication 1000 UNIT(S): at 11:11

## 2018-03-23 RX ADMIN — CEFEPIME 100 MILLIGRAM(S): 1 INJECTION, POWDER, FOR SOLUTION INTRAMUSCULAR; INTRAVENOUS at 21:30

## 2018-03-23 NOTE — PROGRESS NOTE ADULT - ASSESSMENT
Neutropenic fever and history of AML on chemo  - ID: Resume Cefepime, add Daptomycin 900 daily, resume Posaconazole  - f/u blood cultures from peripheral and PICC line. [Previously according to old records from [facility] had S. gallolyticus]. Might d/c Dapto if Strep, covered by Cefepime.  - PICC line removed yesterday  - c/w sorafenib 200mg q12 according to Dr Corrigan at [facility] ([id number])  - ANC improving; resume Sorafenib and monitor. Had 1 episode of LGF resolved after Tylenol.    h/o Benign pituitary tumor; c/w cabergoline 0.5mg qD    CAD s/p stent; per pt's wife, not on ASA, plavix and metoprolol according to Cardiologist    A fib s/p ablation; not on AC according to cardiologist, c/w Cardizem 120mg qD    HFpEF (stable); Entresto and lasix 20mg qd    Constipation; had a bowel movement yesterday, c/w senna, colace, +/- dulcolax    Hypokalemia; 40 meqs PO given. F/u BMP tomorrow.    DVT ppx; Lovenox  GI ppx; Protonix

## 2018-03-23 NOTE — PROGRESS NOTE ADULT - SUBJECTIVE AND OBJECTIVE BOX
JILL JAEGER  77y  Male      Patient is a 77y old  Male who presents with a chief complaint of neutropenic fever (21 Mar 2018 22:16)      INTERVAL HPI/OVERNIGHT EVENTS:  He feels weak and malaise, still with fever.     Vital Signs Last 24 Hrs  T(C): 38.1 (23 Mar 2018 12:07), Max: 38.3 (23 Mar 2018 05:32)  T(F): 100.5 (23 Mar 2018 12:07), Max: 101 (23 Mar 2018 05:32)  HR: 73 (23 Mar 2018 12:07) (73 - 84)  BP: 130/76 (23 Mar 2018 12:07) (123/79 - 145/63)  BP(mean): --  RR: 18 (23 Mar 2018 12:07) (18 - 18)  SpO2: --            Consultant(s) Notes Reviewed:  [x ] YES  [ ] NO          MEDICATIONS  (STANDING):  acyclovir   Tablet 400 milliGRAM(s) Oral two times a day  atorvastatin 40 milliGRAM(s) Oral at bedtime  cabergoline 0.5 milliGRAM(s) Oral daily  cefepime  IVPB 2000 milliGRAM(s) IV Intermittent every 8 hours  cholecalciferol 1000 Unit(s) Oral daily  diltiazem    milliGRAM(s) Oral daily  docusate sodium 100 milliGRAM(s) Oral two times a day  enoxaparin Injectable 40 milliGRAM(s) SubCutaneous every 24 hours  furosemide    Tablet 20 milliGRAM(s) Oral daily  pantoprazole    Tablet 40 milliGRAM(s) Oral before breakfast  posaconazole DR Tablet 300 milliGRAM(s) Oral daily  potassium chloride   Powder 40 milliEquivalent(s) Oral once  sacubitril 49 mG/valsartan 51 mG 1 Tablet(s) Oral every 12 hours  senna 2 Tablet(s) Oral at bedtime  SORAfenib 200 milliGRAM(s) Oral two times a day    MEDICATIONS  (PRN):  acetaminophen   Tablet 650 milliGRAM(s) Oral every 6 hours PRN For Temp greater than 38 C (100.4 F)  zolpidem 5 milliGRAM(s) Oral at bedtime PRN Insomnia      LABS                          8.2    0.77  )-----------( 224      ( 23 Mar 2018 08:46 )             24.2     03-23    139  |  102  |  10  ----------------------------<  133<H>  3.4<L>   |  24  |  0.7    Ca    7.9<L>      23 Mar 2018 08:46  Mg     1.9         TPro  5.5<L>  /  Alb  3.0<L>  /  TBili  0.7  /  DBili  x   /  AST  19  /  ALT  25  /  AlkPhos  61        Urinalysis Basic - ( 21 Mar 2018 16:03 )    Color: Yellow / Appearance: Clear / S.020 / pH: x  Gluc: x / Ketone: Negative  / Bili: Negative / Urobili: 0.2   Blood: x / Protein: 30 / Nitrite: Negative   Leuk Esterase: Negative / RBC: 1-2 /HPF / WBC 1-2 /HPF   Sq Epi: x / Non Sq Epi: x / Bacteria: Few /HPF      PT/INR - ( 21 Mar 2018 16:03 )   PT: 14.30 sec;   INR: 1.32 ratio         PTT - ( 21 Mar 2018 16:03 )  PTT:26.6 sec  Lactate Trend   @ 16:03 Lactate:1.1         CAPILLARY BLOOD GLUCOSE          Culture - Blood (collected 18 @ 10:12)  Source: .Blood None  Preliminary Report (18 @ 15:01):    No growth to date.    Culture - Blood (collected 18 @ 10:00)  Source: .Blood PICC Tip  Preliminary Report (18 @ 15:02):    No growth to date.    Culture - Urine (collected 18 @ 16:03)  Source: .Urine Clean Catch (Midstream)  Final Report (18 @ 22:30):    No growth    Culture - Blood (collected 18 @ 16:03)  Source: .Blood Blood-Peripheral  Gram Stain (18 @ 13:53):    Growth in aerobic and anaerobic bottles: Gram Positive Cocci in Pairs and    Chains  Final Report (18 @ 12:03):    Growth in aerobic and anaerobic bottles: Alpha hemolytic strep    (not Strep. pneumoniae or Enterococcus)    Single set isolate, possible contaminant. Contact    Microbiology if susceptibility testing clinically    indicated.    "Due to technical problems, Proteus sp. will Not be reported as part of    the BCID panel until further notice" ***Blood Panel PCR results on this    specimen are available    approximately 3 hours after the Gram stain result.***    Gram stain, PCR, and/or culture results may not always    correspond due to difference in methodologies.    ************************************************************    This PCR assay was performed using [employer]    The following targets are tested for: Enterococcus,    vancomycin resistant enterococci, Listeria monocytogenes,    coagulase negative staphylococci, S. aureus,    methicillin resistant S. aureus, Streptococcus agalactiae    (Group B), S. pneumoniae, S. pyogenes (Group A),    Acinetobacter baumannii, Enterobacter cloacae, E. coli,    Klebsiella oxytoca, K. pneumoniae, Proteus sp.,    Serratia marcescens, Haemophilus influenzae,    Neisseria meningitidis, Pseudomonas aeruginosa, Candida    albicans, C. glabrata, C krusei, C parapsilosis,    C. tropicalis and the KPC resistance gene.  Organism: Blood Culture PCR (18 @ 12:03)  Organism: Blood Culture PCR (18 @ 12:03)      -  Streptococcus sp. (Not Grp A, B or S pneumoniae): Detec      Method Type: PCR    Culture - Blood (collected 18 @ 16:03)  Source: .Blood Blood-Peripheral  Preliminary Report (18 @ 03:01):    No growth to date.        RADIOLOGY & ADDITIONAL TESTS:    Imaging Personally Reviewed:  [ ] YES  [ ] NO    HEALTH ISSUES - PROBLEM Dx:        Physical exam:   Alert, oriented x3, in no distress  HEENT: PERRLA, dry nasal mucosa with crusted mucosa.   Neck: no JVD, no LAD  Chest; clear, no rales or wheezing  Heart: RRR s1 s2, no murmurs  Abdomen: soft, non-tender. no edema  Ext;  LE normal, pulses are ok b/l.

## 2018-03-23 NOTE — PROGRESS NOTE ADULT - SUBJECTIVE AND OBJECTIVE BOX
JILL JAEGER  77y, Male      OVERNIGHT EVENTS:    Wife a bedside. Still with chills. PICC removed. Has no focal complaints besides constipation    VITALS:  T(F): 99.2, Max: 101 (18 @ 05:32)  HR: 84  BP: 145/63  RR: 18Vital Signs Last 24 Hrs  T(C): 37.3 (23 Mar 2018 08:07), Max: 38.3 (23 Mar 2018 05:32)  T(F): 99.2 (23 Mar 2018 08:07), Max: 101 (23 Mar 2018 05:32)  HR: 84 (23 Mar 2018 05:32) (78 - 84)  BP: 145/63 (23 Mar 2018 05:32) (123/79 - 151/72)  BP(mean): --  RR: 18 (23 Mar 2018 05:32) (18 - 18)  SpO2: --    TESTS & MEASUREMENTS:                        8.2    x     )-----------( 224      ( 23 Mar 2018 08:46 )             24.2         133<L>  |  100  |  9<L>  ----------------------------<  94  3.6   |  23  |  0.6<L>    Ca    7.6<L>      22 Mar 2018 10:12    TPro  5.4<L>  /  Alb  2.9<L>  /  TBili  0.9  /  DBili  0.3<H>  /  AST  15  /  ALT  15  /  AlkPhos  56  03-21    LIVER FUNCTIONS - ( 21 Mar 2018 16:03 )  Alb: 2.9 g/dL / Pro: 5.4 g/dL / ALK PHOS: 56 U/L / ALT: 15 U/L / AST: 15 U/L / GGT: x             Culture - Urine (collected 18 @ 16:03)  Source: .Urine Clean Catch (Midstream)  Final Report (18 @ 22:30):    No growth    Culture - Blood (collected 18 @ 16:03)  Source: .Blood Blood-Peripheral  Gram Stain (18 @ 13:53):    Growth in aerobic and anaerobic bottles: Gram Positive Cocci in Pairs and    Chains  Preliminary Report (18 @ 13:54):    Growth in aerobic and anaerobic bottles: Gram Positive Cocci in Pairs and    Chains    "Due to technical problems, Proteus sp. will Not be reported as part of    the BCID panel until further notice" ***Blood Panel PCR results on this    specimen are available    approximately 3 hours after the Gram stain result.***    Gram stain, PCR, and/or culture results may not always    correspond due to difference in methodologies.    ************************************************************    This PCR assay was performed using [employer].    The following targets are tested for: Enterococcus,    vancomycin resistant enterococci, Listeria monocytogenes,    coagulase negative staphylococci, S. aureus,    methicillin resistant S. aureus, Streptococcus agalactiae    (Group B), S. pneumoniae, S. pyogenes (Group A),    Acinetobacter baumannii, Enterobacter cloacae, E. coli,    Klebsiella oxytoca, K. pneumoniae, Proteus sp.,    Serratia marcescens, Haemophilus influenzae,    Neisseria meningitidis, Pseudomonas aeruginosa, Candida    albicans, C. glabrata, C krusei, C parapsilosis,    C. tropicalis and the KPC resistance gene.  Organism: Blood Culture PCR (18 @ 15:13)  Organism: Blood Culture PCR (18 @ 15:13)      -  Streptococcus sp. (Not Grp A, B or S pneumoniae): Detec      Method Type: PCR    Culture - Blood (collected 18 @ 16:03)  Source: .Blood Blood-Peripheral  Preliminary Report (18 @ 03:01):    No growth to date.      Urinalysis Basic - ( 21 Mar 2018 16:03 )    Color: Yellow / Appearance: Clear / S.020 / pH: x  Gluc: x / Ketone: Negative  / Bili: Negative / Urobili: 0.2   Blood: x / Protein: 30 / Nitrite: Negative   Leuk Esterase: Negative / RBC: 1-2 /HPF / WBC 1-2 /HPF   Sq Epi: x / Non Sq Epi: x / Bacteria: Few /HPF          RADIOLOGY & ADDITIONAL TESTS:    ANTIBIOTICS:  acyclovir   Tablet 400 milliGRAM(s) Oral two times a day  cefepime  IVPB 2000 milliGRAM(s) IV Intermittent every 8 hours  DAPTOmycin IVPB 900 milliGRAM(s) IV Intermittent daily  posaconazole DR Tablet 300 milliGRAM(s) Oral daily

## 2018-03-23 NOTE — PROGRESS NOTE ADULT - ASSESSMENT
sepsis secondary to Strep. ID/S is still pending.  PICC discontinued.  Will adjust antibiotics based on final results.  Tidnmncpln160 mg iv q12h  Cefepime 2 gm iv q12h  Posaconazole 300mg po q24h  acyclovir  Check ECHO.  BCs through the PICC have been negative.   Translocation ? Possible.

## 2018-03-23 NOTE — PROGRESS NOTE ADULT - SUBJECTIVE AND OBJECTIVE BOX
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 Pt was at [facility] clinic for routine blood draw the day of presentation, was found to have temp of 100.9 initially and then normalized to 98, so the clinic did not send pt to the ED. Pt went home and retook temperature which showed Temp of 103, so pt decided to come to the hospital. Pt has been having general malaise, decreased appetite and constipation for the past week. Per pt, he had sepsis 2/2 gut bacteria translocation one month after he was diagnosed with AML and he prolonged constipation at that time.   Pt had a bowel movement in the ED. Denies SOB, CP, cough, abdominal pain,  symptoms, leg edema.     Pt currently has a PICC line for blood and chemo infusion. site looks clean/dry/ intact.   T max of 101.9 in ED, was given IV Vancomycin 1000mg and cefepime 1000mg X1.    PMH & PSH  Afib    AML (acute myeloblastic leukemia)    CAD (coronary artery disease) s/p stent in proximal LAD  CHF (congestive heart failure)    HTN (hypertension)    Pituitary tumor  benign.  H/O total knee replacement, left    History of cardiac radiofrequency ablation.    Home Medications:  acyclovir: 400  orally 2 times a day (21 Mar 2018 23:38)  Ambien 5 mg oral tablet: 1 tab(s) orally once a day (at bedtime) (21 Mar 2018 23:38)  cabergoline 0.5 mg oral tablet: 1 tab(s) orally once a day (21 Mar 2018 23:38)  Cardizem  mg/24 hours oral capsule, extended release: 1 cap(s) orally once a day (21 Mar 2018 23:38)  Entresto:  (21 Mar 2018 23:38)  Lasix 20 mg oral tablet: 1 tab(s) orally once a day (21 Mar 2018 23:38)  Noxafil 100 mg oral delayed release tablet: 3 tab(s) orally once a day (21 Mar 2018 23:38)  Protonix 40 mg oral delayed release tablet: 1 tab(s) orally once a day (21 Mar 2018 23:38)  SORAfenib 200 mg oral tablet: 2 tab(s) orally 2 times a day (21 Mar 2018 23:38)  Vidaza: every 28 days for 7 days. last dose march 6th (21 Mar 2018 23:38)  Vitamin D3 1000 intl units oral capsule: 1 cap(s) orally once a day (21 Mar 2018 23:38)    MEDICATIONS  (STANDING):  acyclovir   Tablet 400 milliGRAM(s) Oral two times a day  atorvastatin 40 milliGRAM(s) Oral at bedtime  cabergoline 0.5 milliGRAM(s) Oral daily  cefepime  IVPB 2000 milliGRAM(s) IV Intermittent every 8 hours  cholecalciferol 1000 Unit(s) Oral daily  daptomycin 900mg IV daily  diltiazem    milliGRAM(s) Oral daily  docusate sodium 100 milliGRAM(s) Oral two times a day  enoxaparin Injectable 40 milliGRAM(s) SubCutaneous every 24 hours  furosemide    Tablet 20 milliGRAM(s) Oral daily  pantoprazole    Tablet 40 milliGRAM(s) Oral before breakfast  posaconazole DR Tablet 300 milliGRAM(s) Oral daily  sacubitril 49 mG/valsartan 51 mG 1 Tablet(s) Oral every 12 hours  senna 2 Tablet(s) Oral at bedtime  SORAfenib 200 milliGRAM(s) Oral two times a day    MEDICATIONS  (PRN):  acetaminophen   Tablet 650 milliGRAM(s) Oral every 6 hours PRN For Temp greater than 38 C (100.4 F)  zolpidem 5 milliGRAM(s) Oral at bedtime PRN Insomnia    Vital Signs Last 24 Hrs  T(C): 38.1 (23 Mar 2018 12:07), Max: 38.3 (23 Mar 2018 05:32)  T(F): 100.5 (23 Mar 2018 12:07), Max: 101 (23 Mar 2018 05:32)  HR: 73 (23 Mar 2018 12:07) (73 - 84)  BP: 130/76 (23 Mar 2018 12:07) (123/79 - 151/72)  BP(mean): --  RR: 18 (23 Mar 2018 12:07) (18 - 18)  SpO2: --    Physical exam  GENERAL: NAD, well-developed  HEAD:  Atraumatic, Normocephalic  EYES: EOMI, PERRLA, conjunctiva and sclera clear  NECK: Supple, No JVD  CHEST/LUNG: Clear to auscultation bilaterally; No wheeze  HEART: Regular rate and rhythm; No murmurs, rubs, or gallops  ABDOMEN: Soft, Nontender, Nondistended; Bowel sounds present  EXTREMITIES:  trace leg edema.   PSYCH: AAOx3  NEUROLOGY: non-focal  SKIN: PICC line c/d/i    Labs  CBC Full  -  ( 23 Mar 2018 08:46 )  WBC Count : 0.77 K/uL  Hemoglobin : 8.2 g/dL  Hematocrit : 24.2 %  Platelet Count - Automated : 224 K/uL  Mean Cell Volume : 99.2 fL  Mean Cell Hemoglobin : 33.6 pg  Mean Cell Hemoglobin Concentration : 33.9 g/dL  Auto Neutrophil # : 0.15 K/uL  Auto Lymphocyte # : 0.60 K/uL  Auto Monocyte # : 0.01 K/uL  Auto Eosinophil # : 0.00 K/uL  Auto Basophil # : 0.00 K/uL  Auto Neutrophil % : 19.5 %  Auto Lymphocyte % : 77.9 %  Auto Monocyte % : 1.3 %  Auto Eosinophil % : 0.0 %  Auto Basophil % : 0.0 %    03-23    139  |  102  |  10  ----------------------------<  133<H>  3.4<L>   |  24  |  0.7  Ca    7.9<L>      23 Mar 2018 08:46  Mg     1.9     03-23  TPro  5.5<L>  /  Alb  3.0<L>  /  TBili  0.7  /  DBili  x   /  AST  19  /  ALT  25  /  AlkPhos  61  03-23    Microbio  -  Blood culture peripheral: Streptococcus sp. (Not Grp A, B or S pneumoniae): Detec (03.21.18 @ 16:03) PENDING PCR  Gram Stain:   Growth in aerobic and anaerobic bottles: Gram Positive Cocci in Pairs and  Chains (03.21.18 @ 16:03)

## 2018-03-23 NOTE — PROGRESS NOTE ADULT - ASSESSMENT
A/P:   1. Neutropenic fever:   2. Bacteremia: Streptococcus species   He is still with fever. WBC: 0.74,   On Cefepime IV and Daptomycin: as per ID to cover for possible VRE, may change after the BC sensitivity result.   PICC line removed.   Echo showed normal LVEF, no vegetation.     3. AML: s/p recent chemotherapy.  Continue Sorafenib for now  Contact his Oncologist at [facility] for further information.     4. CAD: stable s/p PCI  Not on ASA and Plavix, contineu Metoprolol .     5. AFIb: stable  Continue Cardizem.

## 2018-03-23 NOTE — CONSULT NOTE ADULT - SUBJECTIVE AND OBJECTIVE BOX
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 Pt was at [facility] clinic for routine blood draw the day of presentation, was found to have temp of 100.9 initially and then normalized to 98, so the clinic did not send pt to the ED. Pt went home and retook temperature which showed Temp of 103, so pt decided to come to the hospital. Pt has been having general malaise, decreased appetite and constipation for the past week. Per pt, he had sepsis 2/2 gut bacteria translocation one month after he was diagnosed with AML and he prolonged constipation at that time. Pt had a bowel movement in the ED. Denies SOB, CP, cough, abdominal pain,  symptoms, leg edema.     Patient was diagnosed with AML in October 2017. Patient received one cycle of decitabine and 4 cycles of Vidaza. 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 On January 31 2018 bone marrow biopsy was repeated and it showed 15% myeloblasts but next generation sequencing did not reveal a targetable mutation. In February 2018, the Sorafenib was decreased to 200mg PO BID from 400mg because of persistent nausea.  He platelets are now in the normal range but he remains severely neutropenic. Last round of chemotherapy was from February 28th until March 6th. He normally see Dr. Godinez at [facility] ([phone]).     ROS  General: no fever, weight loss or fatigue  Skin/Breast: No itching, burning, rashes or lesions, no pain, masses or nipple discharge  Opthamologic: no eye pain, visual disturbance or discharge  ENMT: no difficulty hearing, tinnitus or vertigo; No sinus or throat pain, no pain or stiffness in neck  Respiratory and Thorax: no current SOB or wheezing  Cardiovascular: No chest pain, palpitations, dizziness or leg swelling  Gastrointestinal: Patient reports constipation. No abdominal or epigastric pain. No nausea, vomiting, hematemesis, diarrhea. No melena or hematochezia.   Musculoskeletal: No joint pain or swelling; No muscle, back or extremity pain.  Neurological: No headaches, memory loss, loss of strength, numbness or tremors  Psychiatric: No depression, anxiety, mood swings or difficulty sleeping  Hematology/Lymphatics: No enlarged glands, no easy bruising, or bleeding gums  Endocrine: No heat/cold intolerance, no hair loss  Allergic/Immunologic: No hives or eczema      PAST MEDICAL & SURGICAL HISTORY:  Pituitary tumor: benign  CHF (congestive heart failure)  HTN (hypertension)  Afib  CAD (coronary artery disease)  AML (acute myeloblastic leukemia)  H/O total knee replacement, left  History of cardiac radiofrequency ablation        FAMILY HISTORY:  Family history of breast cancer (Mother)      MEDICATIONS  (STANDING):  acyclovir   Tablet 400 milliGRAM(s) Oral two times a day  atorvastatin 40 milliGRAM(s) Oral at bedtime  cabergoline 0.5 milliGRAM(s) Oral daily  cefepime  IVPB 2000 milliGRAM(s) IV Intermittent every 8 hours  cholecalciferol 1000 Unit(s) Oral daily  diltiazem    milliGRAM(s) Oral daily  docusate sodium 100 milliGRAM(s) Oral two times a day  enoxaparin Injectable 40 milliGRAM(s) SubCutaneous every 24 hours  furosemide    Tablet 20 milliGRAM(s) Oral daily  pantoprazole    Tablet 40 milliGRAM(s) Oral before breakfast  posaconazole DR Tablet 300 milliGRAM(s) Oral daily  sacubitril 49 mG/valsartan 51 mG 1 Tablet(s) Oral every 12 hours  senna 2 Tablet(s) Oral at bedtime  SORAfenib 200 milliGRAM(s) Oral two times a day    MEDICATIONS  (PRN):  acetaminophen   Tablet 650 milliGRAM(s) Oral every 6 hours PRN For Temp greater than 38 C (100.4 F)  zolpidem 5 milliGRAM(s) Oral at bedtime PRN Insomnia      Allergies    No Known Allergies        Vital Signs Last 24 Hrs  T(C): 37.3 (23 Mar 2018 08:07), Max: 38.3 (23 Mar 2018 05:32)  T(F): 99.2 (23 Mar 2018 08:07), Max: 101 (23 Mar 2018 05:32)  HR: 84 (23 Mar 2018 05:32) (78 - 84)  BP: 145/63 (23 Mar 2018 05:32) (123/79 - 151/72)  BP(mean): --  RR: 18 (23 Mar 2018 05:32) (18 - 18)  SpO2: --    PHYSICAL EXAM  General: NAD, well groomed, well developed  HEENT: Nontraumatic, normocephalic, No tonsillar erythema, exudates or enlargement;  Moist Mucous membranes, good dentition, no lesions  Lymph: no lymphadenopathy noted  Neck: supple, no JVD, normal thyroid  CV: normal S1/S2 with no murmur rubs or gallops  Lungs: Bilateral air entry, clear to auscultation, no wheezes, no rales, no stridor  Abdomen: soft non-tender non-distended, no hepatosplenomegaly, bowel sounds present  Ext: 2+ Peripheral pulses, no clubbing cyanosis or edema  Skin: no rashes and no petechiae  Neuro: alert and oriented X 3, no focal deficits, good concentration      LABS:                          8.2    0.77  )-----------( 224      ( 23 Mar 2018 08:46 )             24.2         Mean Cell Volume : 99.2 fL  Mean Cell Hemoglobin : 33.6 pg  Mean Cell Hemoglobin Concentration : 33.9 g/dL  Auto Neutrophil # : 0.15 K/uL  Auto Lymphocyte # : 0.60 K/uL  Auto Monocyte # : 0.01 K/uL  Auto Eosinophil # : 0.00 K/uL  Auto Basophil # : 0.00 K/uL  Auto Neutrophil % : 19.5 %  Auto Lymphocyte % : 77.9 %  Auto Monocyte % : 1.3 %  Auto Eosinophil % : 0.0 %  Auto Basophil % : 0.0 %      Serial CBC's  03-23 @ 08:46  Hct-24.2 / Hgb-8.2 / Plat-224 / RBC-2.44 / WBC-0.77  Serial CBC's  03-22 @ 10:12  Hct-22.3 / Hgb-7.6 / Plat-204 / RBC-2.27 / WBC-0.62  Serial CBC's  03-21 @ 16:03  Hct-24.0 / Hgb-8.1 / Plat-203 / RBC-2.43 / WBC-0.54      03-23    139  |  102  |  10  ----------------------------<  133<H>  3.4<L>   |  24  |  0.7    Ca    7.9<L>      23 Mar 2018 08:46  Mg     1.9     03-23    TPro  5.5<L>  /  Alb  3.0<L>  /  TBili  0.7  /  DBili  x   /  AST  19  /  ALT  25  /  AlkPhos  61  03-23      PT/INR - ( 21 Mar 2018 16:03 )   PT: 14.30 sec;   INR: 1.32 ratio         PTT - ( 21 Mar 2018 16:03 )  PTT:26.6 sec      BLOOD SMEAR INTERPRETATION:       RADIOLOGY & ADDITIONAL STUDIES: Patient is a 77y old  Male who presents with a chief complaint of neutropenic fever (21 Mar 2018 22:16)    HPI:  78 yo M with PMHx of 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 Pt was at [facility] clinic for routine blood draw the day of presentation, was found to have temp of 100.9 initially and then normalized to 98, so the clinic did not send pt to the ED. Pt went home and retook temperature which showed Temp of 103, so pt decided to come to the hospital. Pt has been having general malaise, decreased appetite and constipation for the past week. Per pt, he had sepsis 2/2 gut bacteria translocation one month after he was diagnosed with AML and he prolonged constipation at that time. Pt had a bowel movement in the ED. Denies SOB, CP, cough, abdominal pain,  symptoms, leg edema.     Patient was diagnosed with AML in October 2017. Patient received one cycle of decitabine and 4 cycles of Vidaza. 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 On January 31 2018 bone marrow biopsy was repeated and it showed 15% myeloblasts but next generation sequencing did not reveal a targetable mutation. In February 2018, the Sorafenib was decreased to 200mg PO BID from 400mg because of persistent nausea.  He platelets are now in the normal range but he remains severely neutropenic. Last round of chemotherapy was from February 28th until March 6th. He normally see Dr. Godinez at [facility] ([phone]).     ROS  General: no fever, weight loss or fatigue  Skin/Breast: No itching, burning, rashes or lesions, no pain, masses or nipple discharge  Opthamologic: no eye pain, visual disturbance or discharge  ENMT: no difficulty hearing, tinnitus or vertigo; No sinus or throat pain, no pain or stiffness in neck  Respiratory and Thorax: no current SOB or wheezing  Cardiovascular: No chest pain, palpitations, dizziness or leg swelling  Gastrointestinal: Patient reports constipation. No abdominal or epigastric pain. No nausea, vomiting, hematemesis, diarrhea. No melena or hematochezia.   Musculoskeletal: No joint pain or swelling; No muscle, back or extremity pain.  Neurological: No headaches, memory loss, loss of strength, numbness or tremors  Psychiatric: No depression, anxiety, mood swings or difficulty sleeping  Hematology/Lymphatics: No enlarged glands, no easy bruising, or bleeding gums  Endocrine: No heat/cold intolerance, no hair loss  Allergic/Immunologic: No hives or eczema      PAST MEDICAL & SURGICAL HISTORY:  Pituitary tumor: benign  CHF (congestive heart failure)  HTN (hypertension)  Afib  CAD (coronary artery disease)  AML (acute myeloblastic leukemia)  H/O total knee replacement, left  History of cardiac radiofrequency ablation        FAMILY HISTORY:  Family history of breast cancer (Mother)      MEDICATIONS  (STANDING):  acyclovir   Tablet 400 milliGRAM(s) Oral two times a day  atorvastatin 40 milliGRAM(s) Oral at bedtime  cabergoline 0.5 milliGRAM(s) Oral daily  cefepime  IVPB 2000 milliGRAM(s) IV Intermittent every 8 hours  cholecalciferol 1000 Unit(s) Oral daily  diltiazem    milliGRAM(s) Oral daily  docusate sodium 100 milliGRAM(s) Oral two times a day  enoxaparin Injectable 40 milliGRAM(s) SubCutaneous every 24 hours  furosemide    Tablet 20 milliGRAM(s) Oral daily  pantoprazole    Tablet 40 milliGRAM(s) Oral before breakfast  DR Tablet 300 milliGRAM(s) Oral daily  sacubitril 49 mG/valsartan 51 mG 1 Tablet(s) Oral every 12 hours  senna 2 Tablet(s) Oral at bedtime  SORAfenib 200 milliGRAM(s) Oral two times a day    MEDICATIONS  (PRN):  acetaminophen   Tablet 650 milliGRAM(s) Oral every 6 hours PRN For Temp greater than 38 C (100.4 F)  zolpidem 5 milliGRAM(s) Oral at bedtime PRN Insomnia      Allergies    No Known Allergies        Vital Signs Last 24 Hrs  T(C): 37.3 (23 Mar 2018 08:07), Max: 38.3 (23 Mar 2018 05:32)  T(F): 99.2 (23 Mar 2018 08:07), Max: 101 (23 Mar 2018 05:32)  HR: 84 (23 Mar 2018 05:32) (78 - 84)  BP: 145/63 (23 Mar 2018 05:32) (123/79 - 151/72)  BP(mean): --  RR: 18 (23 Mar 2018 05:32) (18 - 18)  SpO2: --    PHYSICAL EXAM  General: NAD, well groomed, well developed  HEENT: Nontraumatic, normocephalic, No tonsillar erythema, exudates or enlargement;  Moist Mucous membranes, good dentition, no lesions  Lymph: no lymphadenopathy noted  Neck: supple, no JVD, normal thyroid  CV: normal S1/S2 with no murmur rubs or gallops  Lungs: Bilateral air entry, clear to auscultation, no wheezes, no rales, no stridor  Abdomen: soft non-tender non-distended, no hepatosplenomegaly, bowel sounds present  Ext: 2+ Peripheral pulses, no clubbing cyanosis or edema  Skin: no rashes and no petechiae  Neuro: alert and oriented X 3, no focal deficits, good concentration      LABS:                          8.2    0.77  )-----------( 224      ( 23 Mar 2018 08:46 )             24.2         Mean Cell Volume : 99.2 fL  Mean Cell Hemoglobin : 33.6 pg  Mean Cell Hemoglobin Concentration : 33.9 g/dL  Auto Neutrophil # : 0.15 K/uL  Auto Lymphocyte # : 0.60 K/uL  Auto Monocyte # : 0.01 K/uL  Auto Eosinophil # : 0.00 K/uL  Auto Basophil # : 0.00 K/uL  Auto Neutrophil % : 19.5 %  Auto Lymphocyte % : 77.9 %  Auto Monocyte % : 1.3 %  Auto Eosinophil % : 0.0 %  Auto Basophil % : 0.0 %      Serial CBC's  03-23 @ 08:46  Hct-24.2 / Hgb-8.2 / Plat-224 / RBC-2.44 / WBC-0.77  Serial CBC's  03-22 @ 10:12  Hct-22.3 / Hgb-7.6 / Plat-204 / RBC-2.27 / WBC-0.62  Serial CBC's  03-21 @ 16:03  Hct-24.0 / Hgb-8.1 / Plat-203 / RBC-2.43 / WBC-0.54      03-23    139  |  102  |  10  ----------------------------<  133<H>  3.4<L>   |  24  |  0.7    Ca    7.9<L>      23 Mar 2018 08:46  Mg     1.9     03-23    TPro  5.5<L>  /  Alb  3.0<L>  /  TBili  0.7  /  DBili  x   /  AST  19  /  ALT  25  /  AlkPhos  61  03-23      PT/INR - ( 21 Mar 2018 16:03 )   PT: 14.30 sec;   INR: 1.32 ratio         PTT - ( 21 Mar 2018 16:03 )  PTT:26.6 sec      BLOOD SMEAR INTERPRETATION:       RADIOLOGY & ADDITIONAL STUDIES:

## 2018-03-23 NOTE — CONSULT NOTE ADULT - ASSESSMENT
77 year old female with AML on Vidaza and Sorafenib presented to [facility] with fever (at home temp was 103) and severe neutropenia.    1) Neutropenic fever secondary to gram positive cocci bacteremia:  S/p PICC line removal  F/U blood cultures  Continue with antibiotics as per ID    2) Neutropenia    Continue to observe as per Dr. Long   Continue to receive Sorafenib even though patient has continued to be neutropenic for the past 2 months because of the Flit 3 mutation.
Sepsis in a absolute neutropenic host with gram positive cocci in pairs/chains.  Sorce is GI/ ( n evidence of that clinically) or PICC line related.  Although on PE no evidence of catheter infection will still recommend removing the PICC as pt hs ongong sepsis.    D/C PICC  [id number] mg iv q12h  Cefepime 2 gm iv q12h  Posaconazole 300mg po q24h  acyclovir  F/U sensitivities of the BCs.  Check ECHO

## 2018-03-24 LAB
-  CANDIDA ALBICANS: SIGNIFICANT CHANGE UP
-  CANDIDA GLABRATA: SIGNIFICANT CHANGE UP
-  CANDIDA KRUSEI: SIGNIFICANT CHANGE UP
-  CANDIDA PARAPSILOSIS: SIGNIFICANT CHANGE UP
-  CANDIDA TROPICALIS: SIGNIFICANT CHANGE UP
-  COAGULASE NEGATIVE STAPHYLOCOCCUS: SIGNIFICANT CHANGE UP
-  K. PNEUMONIAE GROUP: SIGNIFICANT CHANGE UP
-  KPC RESISTANCE GENE: SIGNIFICANT CHANGE UP
-  STREPTOCOCCUS SP. (NOT GRP A, B OR S PNEUMONIAE): SIGNIFICANT CHANGE UP
A BAUMANNII DNA SPEC QL NAA+PROBE: SIGNIFICANT CHANGE UP
ALBUMIN SERPL ELPH-MCNC: 2.5 G/DL — LOW (ref 3.5–5.2)
ALP SERPL-CCNC: 55 U/L — SIGNIFICANT CHANGE UP (ref 30–115)
ALT FLD-CCNC: 19 U/L — SIGNIFICANT CHANGE UP (ref 0–41)
ANION GAP SERPL CALC-SCNC: 13 MMOL/L — SIGNIFICANT CHANGE UP (ref 7–14)
AST SERPL-CCNC: 13 U/L — SIGNIFICANT CHANGE UP (ref 0–41)
BASOPHILS # BLD AUTO: 0 K/UL — SIGNIFICANT CHANGE UP (ref 0–0.2)
BASOPHILS NFR BLD AUTO: 0 % — SIGNIFICANT CHANGE UP (ref 0–1)
BILIRUB SERPL-MCNC: 0.6 MG/DL — SIGNIFICANT CHANGE UP (ref 0.2–1.2)
BUN SERPL-MCNC: 11 MG/DL — SIGNIFICANT CHANGE UP (ref 10–20)
C DIFF BY PCR RESULT: NEGATIVE — SIGNIFICANT CHANGE UP
CALCIUM SERPL-MCNC: 7.7 MG/DL — LOW (ref 8.5–10.1)
CHLORIDE SERPL-SCNC: 101 MMOL/L — SIGNIFICANT CHANGE UP (ref 98–110)
CO2 SERPL-SCNC: 24 MMOL/L — SIGNIFICANT CHANGE UP (ref 17–32)
CREAT SERPL-MCNC: 0.6 MG/DL — LOW (ref 0.7–1.5)
E CLOAC COMP DNA BLD POS QL NAA+PROBE: SIGNIFICANT CHANGE UP
E COLI DNA BLD POS QL NAA+NON-PROBE: SIGNIFICANT CHANGE UP
ENTEROCOC DNA BLD POS QL NAA+NON-PROBE: SIGNIFICANT CHANGE UP
ENTEROCOC DNA BLD POS QL NAA+NON-PROBE: SIGNIFICANT CHANGE UP
EOSINOPHIL # BLD AUTO: 0 K/UL — SIGNIFICANT CHANGE UP (ref 0–0.7)
EOSINOPHIL NFR BLD AUTO: 0 % — SIGNIFICANT CHANGE UP (ref 0–8)
GLUCOSE SERPL-MCNC: 132 MG/DL — HIGH (ref 70–99)
GP B STREP DNA BLD POS QL NAA+NON-PROBE: SIGNIFICANT CHANGE UP
GRAM STN FLD: SIGNIFICANT CHANGE UP
HAEM INFLU DNA BLD POS QL NAA+NON-PROBE: SIGNIFICANT CHANGE UP
HCT VFR BLD CALC: 22.2 % — LOW (ref 42–52)
HGB BLD-MCNC: 7.4 G/DL — CRITICAL LOW (ref 14–18)
IMM GRANULOCYTES NFR BLD AUTO: 1.8 % — HIGH (ref 0.1–0.3)
K OXYTOCA DNA BLD POS QL NAA+NON-PROBE: SIGNIFICANT CHANGE UP
L MONOCYTOG DNA BLD POS QL NAA+NON-PROBE: SIGNIFICANT CHANGE UP
LYMPHOCYTES # BLD AUTO: 0.39 K/UL — LOW (ref 1.2–3.4)
LYMPHOCYTES # BLD AUTO: 69.6 % — HIGH (ref 20.5–51.1)
MAGNESIUM SERPL-MCNC: 1.8 MG/DL — SIGNIFICANT CHANGE UP (ref 1.8–2.4)
MCHC RBC-ENTMCNC: 33.2 PG — HIGH (ref 27–31)
MCHC RBC-ENTMCNC: 33.3 G/DL — SIGNIFICANT CHANGE UP (ref 32–37)
MCV RBC AUTO: 99.6 FL — HIGH (ref 80–94)
METHOD TYPE: SIGNIFICANT CHANGE UP
MONOCYTES # BLD AUTO: 0.03 K/UL — LOW (ref 0.1–0.6)
MONOCYTES NFR BLD AUTO: 5.4 % — SIGNIFICANT CHANGE UP (ref 1.7–9.3)
MRSA SPEC QL CULT: SIGNIFICANT CHANGE UP
MSSA DNA SPEC QL NAA+PROBE: SIGNIFICANT CHANGE UP
N MEN ISLT CULT: SIGNIFICANT CHANGE UP
NEUTROPHILS # BLD AUTO: 0.13 K/UL — LOW (ref 1.4–6.5)
NEUTROPHILS NFR BLD AUTO: 23.2 % — LOW (ref 42.2–75.2)
NRBC # BLD: 0 /100 WBCS — SIGNIFICANT CHANGE UP (ref 0–0)
P AERUGINOSA DNA BLD POS NAA+NON-PROBE: SIGNIFICANT CHANGE UP
PHOSPHATE SERPL-MCNC: 2.2 MG/DL — SIGNIFICANT CHANGE UP (ref 2.1–4.9)
PLATELET # BLD AUTO: 196 K/UL — SIGNIFICANT CHANGE UP (ref 130–400)
POTASSIUM SERPL-MCNC: 3.4 MMOL/L — LOW (ref 3.5–5)
POTASSIUM SERPL-SCNC: 3.4 MMOL/L — LOW (ref 3.5–5)
PROT SERPL-MCNC: 5.2 G/DL — LOW (ref 6–8)
RBC # BLD: 2.23 M/UL — LOW (ref 4.7–6.1)
RBC # FLD: 19.1 % — HIGH (ref 11.5–14.5)
S MARCESCENS DNA BLD POS NAA+NON-PROBE: SIGNIFICANT CHANGE UP
S PNEUM DNA BLD POS QL NAA+NON-PROBE: SIGNIFICANT CHANGE UP
S PYO DNA BLD POS QL NAA+NON-PROBE: SIGNIFICANT CHANGE UP
SODIUM SERPL-SCNC: 138 MMOL/L — SIGNIFICANT CHANGE UP (ref 135–146)
WBC # BLD: 0.56 K/UL — CRITICAL LOW (ref 4.8–10.8)
WBC # FLD AUTO: 0.56 K/UL — CRITICAL LOW (ref 4.8–10.8)

## 2018-03-24 RX ORDER — POTASSIUM CHLORIDE 20 MEQ
20 PACKET (EA) ORAL ONCE
Qty: 0 | Refills: 0 | Status: COMPLETED | OUTPATIENT
Start: 2018-03-24 | End: 2018-03-24

## 2018-03-24 RX ADMIN — CEFEPIME 100 MILLIGRAM(S): 1 INJECTION, POWDER, FOR SOLUTION INTRAMUSCULAR; INTRAVENOUS at 21:53

## 2018-03-24 RX ADMIN — Medication 400 MILLIGRAM(S): at 06:16

## 2018-03-24 RX ADMIN — SACUBITRIL AND VALSARTAN 1 TABLET(S): 24; 26 TABLET, FILM COATED ORAL at 17:41

## 2018-03-24 RX ADMIN — SORAFENIB 200 MILLIGRAM(S): 200 TABLET, FILM COATED ORAL at 22:04

## 2018-03-24 RX ADMIN — CEFEPIME 100 MILLIGRAM(S): 1 INJECTION, POWDER, FOR SOLUTION INTRAMUSCULAR; INTRAVENOUS at 13:33

## 2018-03-24 RX ADMIN — Medication 400 MILLIGRAM(S): at 17:40

## 2018-03-24 RX ADMIN — Medication 120 MILLIGRAM(S): at 05:45

## 2018-03-24 RX ADMIN — Medication 650 MILLIGRAM(S): at 18:25

## 2018-03-24 RX ADMIN — Medication 1000 UNIT(S): at 15:16

## 2018-03-24 RX ADMIN — POSACONAZOLE 300 MILLIGRAM(S): 100 TABLET, DELAYED RELEASE ORAL at 10:27

## 2018-03-24 RX ADMIN — Medication 20 MILLIGRAM(S): at 05:45

## 2018-03-24 RX ADMIN — ATORVASTATIN CALCIUM 40 MILLIGRAM(S): 80 TABLET, FILM COATED ORAL at 21:53

## 2018-03-24 RX ADMIN — Medication 650 MILLIGRAM(S): at 05:47

## 2018-03-24 RX ADMIN — Medication 100 MILLIGRAM(S): at 17:40

## 2018-03-24 RX ADMIN — CEFEPIME 100 MILLIGRAM(S): 1 INJECTION, POWDER, FOR SOLUTION INTRAMUSCULAR; INTRAVENOUS at 05:45

## 2018-03-24 RX ADMIN — ZOLPIDEM TARTRATE 5 MILLIGRAM(S): 10 TABLET ORAL at 21:56

## 2018-03-24 RX ADMIN — Medication 20 MILLIEQUIVALENT(S): at 20:57

## 2018-03-24 RX ADMIN — SORAFENIB 200 MILLIGRAM(S): 200 TABLET, FILM COATED ORAL at 10:28

## 2018-03-24 RX ADMIN — PANTOPRAZOLE SODIUM 40 MILLIGRAM(S): 20 TABLET, DELAYED RELEASE ORAL at 05:54

## 2018-03-24 RX ADMIN — ENOXAPARIN SODIUM 40 MILLIGRAM(S): 100 INJECTION SUBCUTANEOUS at 10:27

## 2018-03-24 RX ADMIN — SACUBITRIL AND VALSARTAN 1 TABLET(S): 24; 26 TABLET, FILM COATED ORAL at 06:16

## 2018-03-24 NOTE — PROGRESS NOTE ADULT - SUBJECTIVE AND OBJECTIVE BOX
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 Pt was at [facility] clinic for routine blood draw the day of presentation, was found to have temp of 100.9 initially and then normalized to 98, so the clinic did not send pt to the ED. Pt went home and retook temperature which showed Temp of 103, so pt decided to come to the hospital. Pt has been having general malaise, decreased appetite and constipation for the past week. Per pt, he had sepsis 2/2 gut bacteria translocation one month after he was diagnosed with AML and he prolonged constipation at that time.   Pt had a bowel movement in the ED. Denies SOB, CP, cough, abdominal pain,  symptoms, leg edema.     Pt currently has a PICC line for blood and chemo infusion. site looks clean/dry/ intact.   T max of 101.9 in ED, was given IV Vancomycin 1000mg and cefepime 1000mg X1.    PMH & PSH  Afib    AML (acute myeloblastic leukemia)    CAD (coronary artery disease) s/p stent in proximal LAD  CHF (congestive heart failure)    HTN (hypertension)    Pituitary tumor  benign.  H/O total knee replacement, left    History of cardiac radiofrequency ablation.    Home Medications:  acyclovir: 400  orally 2 times a day (21 Mar 2018 23:38)  Ambien 5 mg oral tablet: 1 tab(s) orally once a day (at bedtime) (21 Mar 2018 23:38)  cabergoline 0.5 mg oral tablet: 1 tab(s) orally once a day (21 Mar 2018 23:38)  Cardizem  mg/24 hours oral capsule, extended release: 1 cap(s) orally once a day (21 Mar 2018 23:38)  Entresto:  (21 Mar 2018 23:38)  Lasix 20 mg oral tablet: 1 tab(s) orally once a day (21 Mar 2018 23:38)  Noxafil 100 mg oral delayed release tablet: 3 tab(s) orally once a day (21 Mar 2018 23:38)  Protonix 40 mg oral delayed release tablet: 1 tab(s) orally once a day (21 Mar 2018 23:38)  SORAfenib 200 mg oral tablet: 2 tab(s) orally 2 times a day (21 Mar 2018 23:38)  Vidaza: every 28 days for 7 days. last dose march 6th (21 Mar 2018 23:38)  Vitamin D3 1000 intl units oral capsule: 1 cap(s) orally once a day (21 Mar 2018 23:38)    MEDICATIONS  (STANDING):  acyclovir   Tablet 400 milliGRAM(s) Oral two times a day  atorvastatin 40 milliGRAM(s) Oral at bedtime  cabergoline 0.5 milliGRAM(s) Oral daily  cefepime  IVPB 2000 milliGRAM(s) IV Intermittent every 8 hours  cholecalciferol 1000 Unit(s) Oral daily  diltiazem    milliGRAM(s) Oral daily  docusate sodium 100 milliGRAM(s) Oral two times a day  enoxaparin Injectable 40 milliGRAM(s) SubCutaneous every 24 hours  furosemide    Tablet 20 milliGRAM(s) Oral daily  pantoprazole    Tablet 40 milliGRAM(s) Oral before breakfast  posaconazole DR Tablet 300 milliGRAM(s) Oral daily  sacubitril 49 mG/valsartan 51 mG 1 Tablet(s) Oral every 12 hours  senna 2 Tablet(s) Oral at bedtime  SORAfenib 200 milliGRAM(s) Oral two times a day    MEDICATIONS  (PRN):  acetaminophen   Tablet 650 milliGRAM(s) Oral every 6 hours PRN For Temp greater than 38 C (100.4 F)  zolpidem 5 milliGRAM(s) Oral at bedtime PRN Insomnia    Vital Signs Last 24 Hrs  T(C): 38.4 (24 Mar 2018 06:16), Max: 38.5 (23 Mar 2018 18:47)  T(F): 101.1 (24 Mar 2018 06:16), Max: 101.3 (23 Mar 2018 18:47)  HR: 93 (24 Mar 2018 06:16) (73 - 93)  BP: 160/81 (24 Mar 2018 06:16) (130/76 - 160/81)  BP(mean): --  RR: 18 (24 Mar 2018 06:16) (18 - 18)  SpO2: --    Physical exam  GENERAL: NAD, well-developed  HEAD:  Atraumatic, Normocephalic  EYES: EOMI, PERRLA, conjunctiva and sclera clear  NECK: Supple, No JVD  CHEST/LUNG: Clear to auscultation bilaterally; No wheeze  HEART: Regular rate and rhythm; No murmurs, rubs, or gallops  ABDOMEN: Soft, Nontender, Nondistended; Bowel sounds present  EXTREMITIES:  trace leg edema.   PSYCH: AAOx3  NEUROLOGY: non-focal  SKIN: no erythema at PICC line site    Labs  Pending labs today    CBC Full  -  ( 23 Mar 2018 08:46 )  WBC Count : 0.77 K/uL  Hemoglobin : 8.2 g/dL  Hematocrit : 24.2 %  Platelet Count - Automated : 224 K/uL  Mean Cell Volume : 99.2 fL  Mean Cell Hemoglobin : 33.6 pg  Mean Cell Hemoglobin Concentration : 33.9 g/dL  Auto Neutrophil # : 0.15 K/uL  Auto Lymphocyte # : 0.60 K/uL  Auto Monocyte # : 0.01 K/uL  Auto Eosinophil # : 0.00 K/uL  Auto Basophil # : 0.00 K/uL  Auto Neutrophil % : 19.5 %  Auto Lymphocyte % : 77.9 %  Auto Monocyte % : 1.3 %  Auto Eosinophil % : 0.0 %  Auto Basophil % : 0.0 %    03-23    139  |  102  |  10  ----------------------------<  133<H>  3.4<L>   |  24  |  0.7    Ca    7.9<L>      23 Mar 2018 08:46  Mg     1.9     03-23  TPro  5.5<L>  /  Alb  3.0<L>  /  TBili  0.7  /  DBili  x   /  AST  19  /  ALT  25  /  AlkPhos  61  03-23      Microbio  -  Blood culture peripheral: Streptococcus sp. (Not Grp A, B or S pneumoniae): Detec (03.21.18 @ 16:03) PENDING PCR  Gram Stain:   Growth in aerobic and anaerobic bottles: Gram Positive Cocci in Pairs and  Chains (03.21.18 @ 16:03)  Culture Results:   No growth to date. (03.22.18 @ 10:12)  Specimen Source: .Blood PICC Tip (03.22.18 @ 10:00)  No growth to date. (03.22.18 @ 10:12)    < from: Transthoracic Echocardiogram (03.23.18 @ 09:45) >  Summary:   1. Normal global left ventricular systolic function.   2. Mildly increased LV wall thickness.   3. Spectral Doppler shows pseudonormal pattern of left ventricular   myocardial filling (Grade II diastolic dysfunction).   4. Moderateto severe mitral annular calcification.   5. Mild aortic regurgitation.    < end of copied text >

## 2018-03-24 NOTE — PROGRESS NOTE ADULT - ASSESSMENT
Neutropenic fever and history of AML on chemo  - ID: Resume Cefepime, add Daptomycin 900 daily, resume Posaconazole  - Blood cultures from peripheral and PICC line prelim negative. Might d/c Dapto after sensitivities (on Dapto for VRE likelihood)  - PICC line site clear  - c/w sorafenib 200mg q12 according to Dr Corrigan at [facility] ([phone])  - ANC improving; resume Sorafenib and monitor. Still having LGF.  - Normal TTE no vegetations    h/o Benign pituitary tumor; c/w cabergoline 0.5mg qD    CAD s/p stent; per pt's wife, not on ASA, plavix and metoprolol according to Cardiologist    A fib s/p ablation; not on AC according to cardiologist, c/w Cardizem 120mg qD    HFpEF (stable); Entresto and lasix 20mg qd.    Constipation; had a bowel movement yesterday, c/w senna, colace, +/- dulcolax    Hypokalemia; 40 meqs PO given yesterday. F/u BMP today.    DVT ppx; Lovenox  GI ppx; Protonix

## 2018-03-24 NOTE — CHART NOTE - NSCHARTNOTEFT_GEN_A_CORE
Spoke to Wife who requested:    a) Reverse airflow isolation room for neutropenic fever  - Maintains that this is the protocol at [facility] for neutropenic fever  - Will attempt to reach out to ID tomorrow regarding necessity for reverse airflow isolation room    b) Hb < 8  - At [facility], due to a number of comorbidities, she maintains that the cardiologist at [facility] transfused him if Hb < 8   - I explained that the Hb has been variable for the past couple of days; today's labs may be dilutional (Hb 7.4) and that no transfusion is warranted at this time   - Will attempt to reach out to [facility] cardiologist tomorrow

## 2018-03-24 NOTE — PROGRESS NOTE ADULT - ASSESSMENT
A/P:   1. Neutropenic fever:   2. Bacteremia: Blood culture grew Streptococcus species   He is still with fever. WBC: 0.77,   On Cefepime IV and Daptomycin: as per ID to cover for possible VRE, may change to Vancomycin after the BC sensitivity result.   PICC line removed.   Echo showed normal LVEF, no vegetation.     3. AML: s/p recent chemotherapy.  Continue Sorafenib for now  Oncology follow up appreciated.     4. CAD: stable s/p PCI  Not on ASA and Plavix, continue Metoprolol .     5. AFIb: stable  Continue Cardizem A/P:   1. Neutropenic fever:   2. Bacteremia: Blood culture grew Streptococcus species   He is still with fever. WBC: 0.77,   On Cefepime IV and Daptomycin: as per ID to cover for possible VRE, may change to Vancomycin after the BC sensitivity result.   PICC line removed.   Echo showed normal LVEF, no vegetation.   he reported diarrhea, if continued will send C.diff and start contact isolation.     3. AML: s/p recent chemotherapy.  Continue Sorafenib for now  Oncology follow up appreciated.     4. CAD: stable s/p PCI  Not on ASA and Plavix, continue Metoprolol .     5. AFIb: stable  Continue Cardizem

## 2018-03-24 NOTE — PROGRESS NOTE ADULT - SUBJECTIVE AND OBJECTIVE BOX
JILL JAEGER  77y  Male      Patient is a 77y old  Male who presents with a chief complaint of neutropenic fever (21 Mar 2018 22:16)      INTERVAL HPI/OVERNIGHT EVENTS:  patient still with fever, chills and malaise.   Vital Signs Last 24 Hrs  T(C): 38.4 (24 Mar 2018 06:16), Max: 38.5 (23 Mar 2018 18:47)  T(F): 101.1 (24 Mar 2018 06:16), Max: 101.3 (23 Mar 2018 18:47)  HR: 93 (24 Mar 2018 06:16) (73 - 93)  BP: 160/81 (24 Mar 2018 06:16) (130/76 - 160/81)  BP(mean): --  RR: 18 (24 Mar 2018 06:16) (18 - 18)  SpO2: --            Consultant(s) Notes Reviewed:  [x ] YES  [ ] NO          MEDICATIONS  (STANDING):  acyclovir   Tablet 400 milliGRAM(s) Oral two times a day  atorvastatin 40 milliGRAM(s) Oral at bedtime  cabergoline 0.5 milliGRAM(s) Oral daily  cefepime  IVPB 2000 milliGRAM(s) IV Intermittent every 8 hours  cholecalciferol 1000 Unit(s) Oral daily  diltiazem    milliGRAM(s) Oral daily  docusate sodium 100 milliGRAM(s) Oral two times a day  enoxaparin Injectable 40 milliGRAM(s) SubCutaneous every 24 hours  furosemide    Tablet 20 milliGRAM(s) Oral daily  pantoprazole    Tablet 40 milliGRAM(s) Oral before breakfast  posaconazole DR Tablet 300 milliGRAM(s) Oral daily  sacubitril 49 mG/valsartan 51 mG 1 Tablet(s) Oral every 12 hours  senna 2 Tablet(s) Oral at bedtime  SORAfenib 200 milliGRAM(s) Oral two times a day    MEDICATIONS  (PRN):  acetaminophen   Tablet 650 milliGRAM(s) Oral every 6 hours PRN For Temp greater than 38 C (100.4 F)  zolpidem 5 milliGRAM(s) Oral at bedtime PRN Insomnia      LABS                          8.2    0.77  )-----------( 224      ( 23 Mar 2018 08:46 )             24.2     03-23    139  |  102  |  10  ----------------------------<  133<H>  3.4<L>   |  24  |  0.7    Ca    7.9<L>      23 Mar 2018 08:46  Mg     1.9     03-23    TPro  5.5<L>  /  Alb  3.0<L>  /  TBili  0.7  /  DBili  x   /  AST  19  /  ALT  25  /  AlkPhos  61  03-23          Lactate Trend  03-21 @ 16:03 Lactate:1.1         CAPILLARY BLOOD GLUCOSE          Culture - Blood (collected 03-22-18 @ 10:12)  Source: .Blood None  Preliminary Report (03-23-18 @ 15:01):    No growth to date.    Culture - Blood (collected 03-22-18 @ 10:00)  Source: .Blood PICC Tip  Preliminary Report (03-23-18 @ 15:02):    No growth to date.    Culture - Urine (collected 03-21-18 @ 16:03)  Source: .Urine Clean Catch (Midstream)  Final Report (03-22-18 @ 22:30):    No growth    Culture - Blood (collected 03-21-18 @ 16:03)  Source: .Blood Blood-Peripheral  Gram Stain (03-22-18 @ 13:53):    Growth in aerobic and anaerobic bottles: Gram Positive Cocci in Pairs and    Chains  Final Report (03-23-18 @ 12:03):    Growth in aerobic and anaerobic bottles: Alpha hemolytic strep    (not Strep. pneumoniae or Enterococcus)    Single set isolate, possible contaminant. Contact    Microbiology if susceptibility testing clinically    indicated.    "Due to technical problems, Proteus sp. will Not be reported as part of    the BCID panel until further notice" ***Blood Panel PCR results on this    specimen are available    approximately 3 hours after the Gram stain result.***    Gram stain, PCR, and/or culture results may not always    correspond due to difference in methodologies.    ************************************************************    This PCR assay was performed using [employer].    The following targets are tested for: Enterococcus,    vancomycin resistant enterococci, Listeria monocytogenes,    coagulase negative staphylococci, S. aureus,    methicillin resistant S. aureus, Streptococcus agalactiae    (Group B), S. pneumoniae, S. pyogenes (Group A),    Acinetobacter baumannii, Enterobacter cloacae, E. coli,    Klebsiella oxytoca, K. pneumoniae, Proteus sp.,    Serratia marcescens, Haemophilus influenzae,    Neisseria meningitidis, Pseudomonas aeruginosa, Candida    albicans, C. glabrata, C krusei, C parapsilosis,    C. tropicalis and the KPC resistance gene.  Organism: Blood Culture PCR (03-23-18 @ 12:03)  Organism: Blood Culture PCR (03-23-18 @ 12:03)      -  Streptococcus sp. (Not Grp A, B or S pneumoniae): Detec      Method Type: PCR    Culture - Blood (collected 03-21-18 @ 16:03)  Source: .Blood Blood-Peripheral  Gram Stain (03-24-18 @ 03:23):    Growth in aerobic bottle: gram variable cocobaclli  Preliminary Report (03-24-18 @ 03:24):    Growth in aerobic bottle: gram varible coccobacilli    "Due to technical problems, Proteus sp. will Not be reported as part of    the BCID panel until further notice"    ***Blood Panel PCR results on this specimen are available    approximately 3 hours after the Gram stain result.***    Gram stain, PCR, and/or culture results may not always    correspond due to difference in methodologies.    ************************************************************    This PCR assay was performed using [employer].    The following targets are tested for: Enterococcus,    vancomycin resistant enterococci, Listeria monocytogenes,    coagulase negative staphylococci, S. aureus,    methicillin resistant S. aureus, Streptococcus agalactiae    (Group B), S. pneumoniae, S. pyogenes (Group A),    Acinetobacter baumannii, Enterobacter cloacae, E. coli,    Klebsiella oxytoca, K. pneumoniae, Proteus sp.,    Serratia marcescens, Haemophilus influenzae,    Neisseria meningitidis, Pseudomonas aeruginosa, Candida    albicans, C. glabrata, C krusei, C parapsilosis,    C. tropicalis and the KPC resistance gene.  Organism: Blood Culture PCR (03-24-18 @ 03:26)  Organism: Blood Culture PCR (03-24-18 @ 03:26)      -  Acinetobacter baumanii: Nondet      -  Candida albicans: Nondet      -  Candida glabrata: Nondet      -  Candida krusei: Nondet      -  Candida parapsilosis: Nondet      -  Candida tropicalis: Nondet      -  Coagulase negative Staphylococcus: Nondet      -  Enterobacter cloacae complex: Nondet      -  Enterococcus species: Nondet      -  Escherichia coli: Nondet      -  Haemophilus influenzae: Nondet      -  Klebsiella oxytoca: Nondet      -  Klebsiella pneumoniae: Nondet      -  Listeria monocytogenes: Nondet      -  Methicillin resistant Staphylococcus aureus (MRSA): Nondet      -  Multidrug (KPC pos) resistant organism: Nondet      -  Neisseria meningitidis: Nondet      -  Pseudomonas aeruginosa: Nondet      -  Serratia marcescens: Nondet      -  Staphylococcus aureus: Nondet      -  Streptococcus agalactiae (Group B): Nondet      -  Streptococcus pneumoniae: Nondet      -  Streptococcus pyogenes (Group A): Nondet      -  Streptococcus sp. (Not Grp A, B or S pneumoniae): Nondet      -  Vancomycin resistant Enterococcus sp.: Nondet      Method Type: PCR        RADIOLOGY & ADDITIONAL TESTS:    Imaging Personally Reviewed:  [ ] YES  [ ] NO    HEALTH ISSUES - PROBLEM Dx:        Physical exam:   Alert, oriented x3, in no distress  HEENT: PERRLA, dry nasal mucosa with crusted mucosa.   Neck: no JVD, no LAD  Chest; clear, no rales or wheezing  Heart: RRR s1 s2, no murmurs  Abdomen: soft, non-tender. no edema  Ext;  LE normal, pulses are ok b/l. JILL JAEGER  77y  Male      Patient is a 77y old  Male who presents with a chief complaint of neutropenic fever (21 Mar 2018 22:16)      INTERVAL HPI/OVERNIGHT EVENTS:  patient still with fever, chills and malaise. he had diarrhea last night and this morning, loose stool.   Vital Signs Last 24 Hrs  T(C): 38.4 (24 Mar 2018 06:16), Max: 38.5 (23 Mar 2018 18:47)  T(F): 101.1 (24 Mar 2018 06:16), Max: 101.3 (23 Mar 2018 18:47)  HR: 93 (24 Mar 2018 06:16) (73 - 93)  BP: 160/81 (24 Mar 2018 06:16) (130/76 - 160/81)  BP(mean): --  RR: 18 (24 Mar 2018 06:16) (18 - 18)  SpO2: --            Consultant(s) Notes Reviewed:  [x ] YES  [ ] NO          MEDICATIONS  (STANDING):  acyclovir   Tablet 400 milliGRAM(s) Oral two times a day  atorvastatin 40 milliGRAM(s) Oral at bedtime  cabergoline 0.5 milliGRAM(s) Oral daily  cefepime  IVPB 2000 milliGRAM(s) IV Intermittent every 8 hours  cholecalciferol 1000 Unit(s) Oral daily  diltiazem    milliGRAM(s) Oral daily  docusate sodium 100 milliGRAM(s) Oral two times a day  enoxaparin Injectable 40 milliGRAM(s) SubCutaneous every 24 hours  furosemide    Tablet 20 milliGRAM(s) Oral daily  pantoprazole    Tablet 40 milliGRAM(s) Oral before breakfast  posaconazole DR Tablet 300 milliGRAM(s) Oral daily  sacubitril 49 mG/valsartan 51 mG 1 Tablet(s) Oral every 12 hours  senna 2 Tablet(s) Oral at bedtime  SORAfenib 200 milliGRAM(s) Oral two times a day    MEDICATIONS  (PRN):  acetaminophen   Tablet 650 milliGRAM(s) Oral every 6 hours PRN For Temp greater than 38 C (100.4 F)  zolpidem 5 milliGRAM(s) Oral at bedtime PRN Insomnia      LABS                          8.2    0.77  )-----------( 224      ( 23 Mar 2018 08:46 )             24.2     03-23    139  |  102  |  10  ----------------------------<  133<H>  3.4<L>   |  24  |  0.7    Ca    7.9<L>      23 Mar 2018 08:46  Mg     1.9     03-23    TPro  5.5<L>  /  Alb  3.0<L>  /  TBili  0.7  /  DBili  x   /  AST  19  /  ALT  25  /  AlkPhos  61  03-23          Lactate Trend  03-21 @ 16:03 Lactate:1.1         CAPILLARY BLOOD GLUCOSE          Culture - Blood (collected 03-22-18 @ 10:12)  Source: .Blood None  Preliminary Report (03-23-18 @ 15:01):    No growth to date.    Culture - Blood (collected 03-22-18 @ 10:00)  Source: .Blood PICC Tip  Preliminary Report (03-23-18 @ 15:02):    No growth to date.    Culture - Urine (collected 03-21-18 @ 16:03)  Source: .Urine Clean Catch (Midstream)  Final Report (03-22-18 @ 22:30):    No growth    Culture - Blood (collected 03-21-18 @ 16:03)  Source: .Blood Blood-Peripheral  Gram Stain (03-22-18 @ 13:53):    Growth in aerobic and anaerobic bottles: Gram Positive Cocci in Pairs and    Chains  Final Report (03-23-18 @ 12:03):    Growth in aerobic and anaerobic bottles: Alpha hemolytic strep    (not Strep. pneumoniae or Enterococcus)    Single set isolate, possible contaminant. Contact    Microbiology if susceptibility testing clinically    indicated.    "Due to technical problems, Proteus sp. will Not be reported as part of    the BCID panel until further notice" ***Blood Panel PCR results on this    specimen are available    approximately 3 hours after the Gram stain result.***    Gram stain, PCR, and/or culture results may not always    correspond due to difference in methodologies.    ************************************************************    This PCR assay was performed using [employer].    The following targets are tested for: Enterococcus,    vancomycin resistant enterococci, Listeria monocytogenes,    coagulase negative staphylococci, S. aureus,    methicillin resistant S. aureus, Streptococcus agalactiae    (Group B), S. pneumoniae, S. pyogenes (Group A),    Acinetobacter baumannii, Enterobacter cloacae, E. coli,    Klebsiella oxytoca, K. pneumoniae, Proteus sp.,    Serratia marcescens, Haemophilus influenzae,    Neisseria meningitidis, Pseudomonas aeruginosa, Candida    albicans, C. glabrata, C krusei, C parapsilosis,    C. tropicalis and the KPC resistance gene.  Organism: Blood Culture PCR (03-23-18 @ 12:03)  Organism: Blood Culture PCR (03-23-18 @ 12:03)      -  Streptococcus sp. (Not Grp A, B or S pneumoniae): Detec      Method Type: PCR    Culture - Blood (collected 03-21-18 @ 16:03)  Source: .Blood Blood-Peripheral  Gram Stain (03-24-18 @ 03:23):    Growth in aerobic bottle: gram variable cocobaclli  Preliminary Report (03-24-18 @ 03:24):    Growth in aerobic bottle: gram varible coccobacilli    "Due to technical problems, Proteus sp. will Not be reported as part of    the BCID panel until further notice"    ***Blood Panel PCR results on this specimen are available    approximately 3 hours after the Gram stain result.***    Gram stain, PCR, and/or culture results may not always    correspond due to difference in methodologies.    ************************************************************    This PCR assay was performed using [employer].    The following targets are tested for: Enterococcus,    vancomycin resistant enterococci, Listeria monocytogenes,    coagulase negative staphylococci, S. aureus,    methicillin resistant S. aureus, Streptococcus agalactiae    (Group B), S. pneumoniae, S. pyogenes (Group A),    Acinetobacter baumannii, Enterobacter cloacae, E. coli,    Klebsiella oxytoca, K. pneumoniae, Proteus sp.,    Serratia marcescens, Haemophilus influenzae,    Neisseria meningitidis, Pseudomonas aeruginosa, Candida    albicans, C. glabrata, C krusei, C parapsilosis,    C. tropicalis and the KPC resistance gene.  Organism: Blood Culture PCR (03-24-18 @ 03:26)  Organism: Blood Culture PCR (03-24-18 @ 03:26)      -  Acinetobacter baumanii: Nondet      -  Candida albicans: Nondet      -  Candida glabrata: Nondet      -  Candida krusei: Nondet      -  Candida parapsilosis: Nondet      -  Candida tropicalis: Nondet      -  Coagulase negative Staphylococcus: Nondet      -  Enterobacter cloacae complex: Nondet      -  Enterococcus species: Nondet      -  Escherichia coli: Nondet      -  Haemophilus influenzae: Nondet      -  Klebsiella oxytoca: Nondet      -  Klebsiella pneumoniae: Nondet      -  Listeria monocytogenes: Nondet      -  Methicillin resistant Staphylococcus aureus (MRSA): Nondet      -  Multidrug (KPC pos) resistant organism: Nondet      -  Neisseria meningitidis: Nondet      -  Pseudomonas aeruginosa: Nondet      -  Serratia marcescens: Nondet      -  Staphylococcus aureus: Nondet      -  Streptococcus agalactiae (Group B): Nondet      -  Streptococcus pneumoniae: Nondet      -  Streptococcus pyogenes (Group A): Nondet      -  Streptococcus sp. (Not Grp A, B or S pneumoniae): Nondet      -  Vancomycin resistant Enterococcus sp.: Nondet      Method Type: PCR        RADIOLOGY & ADDITIONAL TESTS:    Imaging Personally Reviewed:  [ ] YES  [ ] NO    HEALTH ISSUES - PROBLEM Dx:        Physical exam:   Alert, oriented x3, in no distress  HEENT: PERRLA, dry nasal mucosa with crusted mucosa.   Neck: no JVD, no LAD  Chest; clear, no rales or wheezing  Heart: RRR s1 s2, no murmurs  Abdomen: soft, non-tender. no edema  Ext;  LE normal, pulses are ok b/l.

## 2018-03-25 LAB
ALBUMIN SERPL ELPH-MCNC: 2.9 G/DL — LOW (ref 3.5–5.2)
ALP SERPL-CCNC: 58 U/L — SIGNIFICANT CHANGE UP (ref 30–115)
ALT FLD-CCNC: 20 U/L — SIGNIFICANT CHANGE UP (ref 0–41)
ANION GAP SERPL CALC-SCNC: 13 MMOL/L — SIGNIFICANT CHANGE UP (ref 7–14)
AST SERPL-CCNC: 15 U/L — SIGNIFICANT CHANGE UP (ref 0–41)
BASOPHILS # BLD AUTO: 0 K/UL — SIGNIFICANT CHANGE UP (ref 0–0.2)
BASOPHILS NFR BLD AUTO: 0 % — SIGNIFICANT CHANGE UP (ref 0–1)
BILIRUB SERPL-MCNC: 0.6 MG/DL — SIGNIFICANT CHANGE UP (ref 0.2–1.2)
BLD GP AB SCN SERPL QL: SIGNIFICANT CHANGE UP
BUN SERPL-MCNC: 10 MG/DL — SIGNIFICANT CHANGE UP (ref 10–20)
C DIFF TOX GENS STL QL NAA+PROBE: SIGNIFICANT CHANGE UP
CALCIUM SERPL-MCNC: 8 MG/DL — LOW (ref 8.5–10.1)
CHLORIDE SERPL-SCNC: 100 MMOL/L — SIGNIFICANT CHANGE UP (ref 98–110)
CO2 SERPL-SCNC: 24 MMOL/L — SIGNIFICANT CHANGE UP (ref 17–32)
CREAT SERPL-MCNC: 0.6 MG/DL — LOW (ref 0.7–1.5)
EOSINOPHIL # BLD AUTO: 0 K/UL — SIGNIFICANT CHANGE UP (ref 0–0.7)
EOSINOPHIL NFR BLD AUTO: 0 % — SIGNIFICANT CHANGE UP (ref 0–8)
GLUCOSE SERPL-MCNC: 117 MG/DL — HIGH (ref 70–99)
HCT VFR BLD CALC: 23.3 % — LOW (ref 42–52)
HGB BLD-MCNC: 7.8 G/DL — LOW (ref 14–18)
IMM GRANULOCYTES NFR BLD AUTO: 0 % — LOW (ref 0.1–0.3)
LYMPHOCYTES # BLD AUTO: 0.38 K/UL — LOW (ref 1.2–3.4)
LYMPHOCYTES # BLD AUTO: 69.1 % — HIGH (ref 20.5–51.1)
MAGNESIUM SERPL-MCNC: 1.8 MG/DL — SIGNIFICANT CHANGE UP (ref 1.8–2.4)
MCHC RBC-ENTMCNC: 32.9 PG — HIGH (ref 27–31)
MCHC RBC-ENTMCNC: 33.5 G/DL — SIGNIFICANT CHANGE UP (ref 32–37)
MCV RBC AUTO: 98.3 FL — HIGH (ref 80–94)
MONOCYTES # BLD AUTO: 0.03 K/UL — LOW (ref 0.1–0.6)
MONOCYTES NFR BLD AUTO: 5.5 % — SIGNIFICANT CHANGE UP (ref 1.7–9.3)
NEUTROPHILS # BLD AUTO: 0.14 K/UL — LOW (ref 1.4–6.5)
NEUTROPHILS NFR BLD AUTO: 25.4 % — LOW (ref 42.2–75.2)
NRBC # BLD: 0 /100 WBCS — SIGNIFICANT CHANGE UP (ref 0–0)
PLATELET # BLD AUTO: 187 K/UL — SIGNIFICANT CHANGE UP (ref 130–400)
POTASSIUM SERPL-MCNC: 3.3 MMOL/L — LOW (ref 3.5–5)
POTASSIUM SERPL-SCNC: 3.3 MMOL/L — LOW (ref 3.5–5)
PROT SERPL-MCNC: 5.4 G/DL — LOW (ref 6–8)
RBC # BLD: 2.37 M/UL — LOW (ref 4.7–6.1)
RBC # FLD: 19 % — HIGH (ref 11.5–14.5)
SODIUM SERPL-SCNC: 137 MMOL/L — SIGNIFICANT CHANGE UP (ref 135–146)
TYPE + AB SCN PNL BLD: SIGNIFICANT CHANGE UP
WBC # BLD: 0.55 K/UL — CRITICAL LOW (ref 4.8–10.8)
WBC # FLD AUTO: 0.55 K/UL — CRITICAL LOW (ref 4.8–10.8)

## 2018-03-25 RX ORDER — POTASSIUM CHLORIDE 20 MEQ
20 PACKET (EA) ORAL
Qty: 0 | Refills: 0 | Status: COMPLETED | OUTPATIENT
Start: 2018-03-25 | End: 2018-03-25

## 2018-03-25 RX ORDER — POTASSIUM CHLORIDE 20 MEQ
20 PACKET (EA) ORAL ONCE
Qty: 0 | Refills: 0 | Status: COMPLETED | OUTPATIENT
Start: 2018-03-25 | End: 2018-03-25

## 2018-03-25 RX ADMIN — PANTOPRAZOLE SODIUM 40 MILLIGRAM(S): 20 TABLET, DELAYED RELEASE ORAL at 05:45

## 2018-03-25 RX ADMIN — ZOLPIDEM TARTRATE 5 MILLIGRAM(S): 10 TABLET ORAL at 22:08

## 2018-03-25 RX ADMIN — CEFEPIME 100 MILLIGRAM(S): 1 INJECTION, POWDER, FOR SOLUTION INTRAMUSCULAR; INTRAVENOUS at 22:07

## 2018-03-25 RX ADMIN — Medication 650 MILLIGRAM(S): at 13:52

## 2018-03-25 RX ADMIN — Medication 120 MILLIGRAM(S): at 05:42

## 2018-03-25 RX ADMIN — SORAFENIB 200 MILLIGRAM(S): 200 TABLET, FILM COATED ORAL at 22:07

## 2018-03-25 RX ADMIN — ENOXAPARIN SODIUM 40 MILLIGRAM(S): 100 INJECTION SUBCUTANEOUS at 11:15

## 2018-03-25 RX ADMIN — ATORVASTATIN CALCIUM 40 MILLIGRAM(S): 80 TABLET, FILM COATED ORAL at 22:07

## 2018-03-25 RX ADMIN — POSACONAZOLE 300 MILLIGRAM(S): 100 TABLET, DELAYED RELEASE ORAL at 11:15

## 2018-03-25 RX ADMIN — SACUBITRIL AND VALSARTAN 1 TABLET(S): 24; 26 TABLET, FILM COATED ORAL at 18:28

## 2018-03-25 RX ADMIN — SACUBITRIL AND VALSARTAN 1 TABLET(S): 24; 26 TABLET, FILM COATED ORAL at 05:43

## 2018-03-25 RX ADMIN — CEFEPIME 100 MILLIGRAM(S): 1 INJECTION, POWDER, FOR SOLUTION INTRAMUSCULAR; INTRAVENOUS at 13:52

## 2018-03-25 RX ADMIN — CABERGOLINE 0.5 MILLIGRAM(S): 0.5 TABLET ORAL at 11:14

## 2018-03-25 RX ADMIN — Medication 20 MILLIEQUIVALENT(S): at 11:17

## 2018-03-25 RX ADMIN — Medication 400 MILLIGRAM(S): at 18:28

## 2018-03-25 RX ADMIN — Medication 1000 UNIT(S): at 13:56

## 2018-03-25 RX ADMIN — SORAFENIB 200 MILLIGRAM(S): 200 TABLET, FILM COATED ORAL at 11:16

## 2018-03-25 RX ADMIN — Medication 400 MILLIGRAM(S): at 07:12

## 2018-03-25 RX ADMIN — CEFEPIME 100 MILLIGRAM(S): 1 INJECTION, POWDER, FOR SOLUTION INTRAMUSCULAR; INTRAVENOUS at 05:42

## 2018-03-25 RX ADMIN — Medication 20 MILLIGRAM(S): at 05:43

## 2018-03-25 NOTE — PROGRESS NOTE ADULT - SUBJECTIVE AND OBJECTIVE BOX
JILL JAEGER  77y  Male      Patient is a 77y old  Male who presents with a chief complaint of neutropenic fever (21 Mar 2018 22:16)      INTERVAL HPI/OVERNIGHT EVENTS:  patient still with fever and malaise.     Vital Signs Last 24 Hrs  T(C): 38.2 (25 Mar 2018 14:49), Max: 38.2 (24 Mar 2018 20:35)  T(F): 100.7 (25 Mar 2018 14:49), Max: 100.8 (24 Mar 2018 20:35)  HR: 78 (25 Mar 2018 14:49) (78 - 90)  BP: 147/73 (25 Mar 2018 14:49) (143/71 - 161/77)  BP(mean): --  RR: 18 (25 Mar 2018 05:21) (17 - 18)  SpO2: 96% (24 Mar 2018 20:55) (96% - 96%)            Consultant(s) Notes Reviewed:  [x ] YES  [ ] NO          MEDICATIONS  (STANDING):  acyclovir   Tablet 400 milliGRAM(s) Oral two times a day  atorvastatin 40 milliGRAM(s) Oral at bedtime  cabergoline 0.5 milliGRAM(s) Oral daily  cefepime  IVPB 2000 milliGRAM(s) IV Intermittent every 8 hours  cholecalciferol 1000 Unit(s) Oral daily  diltiazem    milliGRAM(s) Oral daily  docusate sodium 100 milliGRAM(s) Oral two times a day  enoxaparin Injectable 40 milliGRAM(s) SubCutaneous every 24 hours  furosemide    Tablet 20 milliGRAM(s) Oral daily  pantoprazole    Tablet 40 milliGRAM(s) Oral before breakfast  posaconazole DR Tablet 300 milliGRAM(s) Oral daily  sacubitril 49 mG/valsartan 51 mG 1 Tablet(s) Oral every 12 hours  SORAfenib 200 milliGRAM(s) Oral two times a day    MEDICATIONS  (PRN):  acetaminophen   Tablet 650 milliGRAM(s) Oral every 6 hours PRN For Temp greater than 38 C (100.4 F)  zolpidem 5 milliGRAM(s) Oral at bedtime PRN Insomnia      LABS                          7.8    0.55  )-----------( 187      ( 25 Mar 2018 07:54 )             23.3     03-25    137  |  100  |  10  ----------------------------<  117<H>  3.3<L>   |  24  |  0.6<L>    Ca    8.0<L>      25 Mar 2018 07:54  Phos  2.2     03-24  Mg     1.8     03-25    TPro  5.4<L>  /  Alb  2.9<L>  /  TBili  0.6  /  DBili  x   /  AST  15  /  ALT  20  /  AlkPhos  58  03-25          Lactate Trend  03-21 @ 16:03 Lactate:1.1         CAPILLARY BLOOD GLUCOSE          Culture - Blood (collected 03-22-18 @ 10:12)  Source: .Blood None  Preliminary Report (03-23-18 @ 15:01):    No growth to date.    Culture - Blood (collected 03-22-18 @ 10:00)  Source: .Blood PICC Tip  Preliminary Report (03-23-18 @ 15:02):    No growth to date.    Culture - Urine (collected 03-21-18 @ 16:03)  Source: .Urine Clean Catch (Midstream)  Final Report (03-22-18 @ 22:30):    No growth    Culture - Blood (collected 03-21-18 @ 16:03)  Source: .Blood Blood-Peripheral  Gram Stain (03-22-18 @ 13:53):    Growth in aerobic and anaerobic bottles: Gram Positive Cocci in Pairs and    Chains  Final Report (03-23-18 @ 12:03):    Growth in aerobic and anaerobic bottles: Alpha hemolytic strep    (not Strep. pneumoniae or Enterococcus)    Single set isolate, possible contaminant. Contact    Microbiology if susceptibility testing clinically    indicated.    "Due to technical problems, Proteus sp. will Not be reported as part of    the BCID panel until further notice" ***Blood Panel PCR results on this    specimen are available    approximately 3 hours after the Gram stain result.***    Gram stain, PCR, and/or culture results may not always    correspond due to difference in methodologies.    ************************************************************    This PCR assay was performed using [employer].    The following targets are tested for: Enterococcus,    vancomycin resistant enterococci, Listeria monocytogenes,    coagulase negative staphylococci, S. aureus,    methicillin resistant S. aureus, Streptococcus agalactiae    (Group B), S. pneumoniae, S. pyogenes (Group A),    Acinetobacter baumannii, Enterobacter cloacae, E. coli,    Klebsiella oxytoca, K. pneumoniae, Proteus sp.,    Serratia marcescens, Haemophilus influenzae,    Neisseria meningitidis, Pseudomonas aeruginosa, Candida    albicans, C. glabrata, C krusei, C parapsilosis,    C. tropicalis and the KPC resistance gene.  Organism: Blood Culture PCR (03-23-18 @ 12:03)  Organism: Blood Culture PCR (03-23-18 @ 12:03)      -  Streptococcus sp. (Not Grp A, B or S pneumoniae): Detec      Method Type: PCR    Culture - Blood (collected 03-21-18 @ 16:03)  Source: .Blood Blood-Peripheral  Gram Stain (03-24-18 @ 03:23):    Growth in aerobic bottle: gram variable cocobaclli  Preliminary Report (03-24-18 @ 03:24):    Growth in aerobic bottle: gram varible coccobacilli    "Due to technical problems, Proteus sp. will Not be reported as part of    the BCID panel until further notice"    ***Blood Panel PCR results on this specimen are available    approximately 3 hours after the Gram stain result.***    Gram stain, PCR, and/or culture results may not always    correspond due to difference in methodologies.    ************************************************************    This PCR assay was performed using [employer].    The following targets are tested for: Enterococcus,    vancomycin resistant enterococci, Listeria monocytogenes,    coagulase negative staphylococci, S. aureus,    methicillin resistant S. aureus, Streptococcus agalactiae    (Group B), S. pneumoniae, S. pyogenes (Group A),    Acinetobacter baumannii, Enterobacter cloacae, E. coli,    Klebsiella oxytoca, K. pneumoniae, Proteus sp.,    Serratia marcescens, Haemophilus influenzae,    Neisseria meningitidis, Pseudomonas aeruginosa, Candida    albicans, C. glabrata, C krusei, C parapsilosis,    C. tropicalis and the KPC resistance gene.  Organism: Blood Culture PCR (03-24-18 @ 03:26)  Organism: Blood Culture PCR (03-24-18 @ 03:26)      -  Acinetobacter baumanii: Nondet      -  Candida albicans: Nondet      -  Candida glabrata: Nondet      -  Candida krusei: Nondet      -  Candida parapsilosis: Nondet      -  Candida tropicalis: Nondet      -  Coagulase negative Staphylococcus: Nondet      -  Enterobacter cloacae complex: Nondet      -  Enterococcus species: Nondet      -  Escherichia coli: Nondet      -  Haemophilus influenzae: Nondet      -  Klebsiella oxytoca: Nondet      -  Klebsiella pneumoniae: Nondet      -  Listeria monocytogenes: Nondet      -  Methicillin resistant Staphylococcus aureus (MRSA): Nondet      -  Multidrug (KPC pos) resistant organism: Nondet      -  Neisseria meningitidis: Nondet      -  Pseudomonas aeruginosa: Nondet      -  Serratia marcescens: Nondet      -  Staphylococcus aureus: Nondet      -  Streptococcus agalactiae (Group B): Nondet      -  Streptococcus pneumoniae: Nondet      -  Streptococcus pyogenes (Group A): Nondet      -  Streptococcus sp. (Not Grp A, B or S pneumoniae): Nondet      -  Vancomycin resistant Enterococcus sp.: Nondet      Method Type: PCR        RADIOLOGY & ADDITIONAL TESTS:    Imaging Personally Reviewed:  [ ] YES  [ ] NO    HEALTH ISSUES - PROBLEM Dx:        Physical exam:   Alert, oriented x3, in no distress  HEENT: PERRLA, dry nasal mucosa with crusted mucosa.   Neck: no JVD, no LAD  Chest; clear, no rales or wheezing  Heart: RRR s1 s2, no murmurs  Abdomen: soft, non-tender. no edema  Ext;  LE normal, pulses are ok b/l.

## 2018-03-25 NOTE — PROGRESS NOTE ADULT - ASSESSMENT
A/P:   1. Neutropenic fever:   2. Bacteremia: Blood culture grew Streptococcus species   He is still with fever. WBC: 0.55,   On Cefepime IV and Daptomycin: as per ID to cover for possible VRE, may change to Vancomycin after the BC sensitivity result.   PICC line removed.   Echo showed normal LVEF, no vegetation.   C.diff negative.     3. AML: s/p recent chemotherapy.  Continue Sorafenib for now  Oncology follow up appreciated.     4. CAD: stable s/p PCI  Not on ASA and Plavix, continue Metoprolol .     5. AFIb: stable  Continue Cardizem

## 2018-03-26 VITALS — WEIGHT: 207.9 LBS

## 2018-03-26 LAB
ALBUMIN SERPL ELPH-MCNC: 2.8 G/DL — LOW (ref 3.5–5.2)
ALP SERPL-CCNC: 57 U/L — SIGNIFICANT CHANGE UP (ref 30–115)
ALT FLD-CCNC: 25 U/L — SIGNIFICANT CHANGE UP (ref 0–41)
ANION GAP SERPL CALC-SCNC: 10 MMOL/L — SIGNIFICANT CHANGE UP (ref 7–14)
AST SERPL-CCNC: 19 U/L — SIGNIFICANT CHANGE UP (ref 0–41)
BASOPHILS # BLD AUTO: 0 K/UL — SIGNIFICANT CHANGE UP (ref 0–0.2)
BASOPHILS NFR BLD AUTO: 0 % — SIGNIFICANT CHANGE UP (ref 0–1)
BILIRUB SERPL-MCNC: 0.5 MG/DL — SIGNIFICANT CHANGE UP (ref 0.2–1.2)
BUN SERPL-MCNC: 13 MG/DL — SIGNIFICANT CHANGE UP (ref 10–20)
CALCIUM SERPL-MCNC: 7.7 MG/DL — LOW (ref 8.5–10.1)
CHLORIDE SERPL-SCNC: 103 MMOL/L — SIGNIFICANT CHANGE UP (ref 98–110)
CO2 SERPL-SCNC: 26 MMOL/L — SIGNIFICANT CHANGE UP (ref 17–32)
CREAT SERPL-MCNC: 0.5 MG/DL — LOW (ref 0.7–1.5)
EOSINOPHIL # BLD AUTO: 0 K/UL — SIGNIFICANT CHANGE UP (ref 0–0.7)
EOSINOPHIL NFR BLD AUTO: 0 % — SIGNIFICANT CHANGE UP (ref 0–8)
GLUCOSE SERPL-MCNC: 141 MG/DL — HIGH (ref 70–99)
HCT VFR BLD CALC: 22.1 % — LOW (ref 42–52)
HGB BLD-MCNC: 7.3 G/DL — CRITICAL LOW (ref 14–18)
IMM GRANULOCYTES NFR BLD AUTO: 6.6 % — HIGH (ref 0.1–0.3)
LYMPHOCYTES # BLD AUTO: 0.36 K/UL — LOW (ref 1.2–3.4)
LYMPHOCYTES # BLD AUTO: 59 % — HIGH (ref 20.5–51.1)
MAGNESIUM SERPL-MCNC: 1.8 MG/DL — SIGNIFICANT CHANGE UP (ref 1.8–2.4)
MCHC RBC-ENTMCNC: 33 G/DL — SIGNIFICANT CHANGE UP (ref 32–37)
MCHC RBC-ENTMCNC: 33 PG — HIGH (ref 27–31)
MCV RBC AUTO: 100 FL — HIGH (ref 80–94)
MONOCYTES # BLD AUTO: 0.03 K/UL — LOW (ref 0.1–0.6)
MONOCYTES NFR BLD AUTO: 4.9 % — SIGNIFICANT CHANGE UP (ref 1.7–9.3)
NEUTROPHILS # BLD AUTO: 0.18 K/UL — LOW (ref 1.4–6.5)
NEUTROPHILS NFR BLD AUTO: 29.5 % — LOW (ref 42.2–75.2)
NRBC # BLD: 0 /100 WBCS — SIGNIFICANT CHANGE UP (ref 0–0)
PLATELET # BLD AUTO: 180 K/UL — SIGNIFICANT CHANGE UP (ref 130–400)
POTASSIUM SERPL-MCNC: 3.1 MMOL/L — LOW (ref 3.5–5)
POTASSIUM SERPL-SCNC: 3.1 MMOL/L — LOW (ref 3.5–5)
PROT SERPL-MCNC: 4.9 G/DL — LOW (ref 6–8)
RBC # BLD: 2.21 M/UL — LOW (ref 4.7–6.1)
RBC # FLD: 19.1 % — HIGH (ref 11.5–14.5)
SODIUM SERPL-SCNC: 139 MMOL/L — SIGNIFICANT CHANGE UP (ref 135–146)
WBC # BLD: 0.61 K/UL — CRITICAL LOW (ref 4.8–10.8)
WBC # FLD AUTO: 0.61 K/UL — CRITICAL LOW (ref 4.8–10.8)

## 2018-03-26 RX ORDER — DILTIAZEM HCL 120 MG
1 CAPSULE, EXT RELEASE 24 HR ORAL
Qty: 0 | Refills: 0 | COMMUNITY

## 2018-03-26 RX ORDER — ZOLPIDEM TARTRATE 10 MG/1
1 TABLET ORAL
Qty: 0 | Refills: 0 | COMMUNITY
Start: 2018-03-26

## 2018-03-26 RX ORDER — FUROSEMIDE 40 MG
1 TABLET ORAL
Qty: 0 | Refills: 0 | COMMUNITY
Start: 2018-03-26

## 2018-03-26 RX ORDER — DILTIAZEM HCL 120 MG
1 CAPSULE, EXT RELEASE 24 HR ORAL
Qty: 0 | Refills: 0 | COMMUNITY
Start: 2018-03-26

## 2018-03-26 RX ORDER — SACUBITRIL AND VALSARTAN 24; 26 MG/1; MG/1
0 TABLET, FILM COATED ORAL
Qty: 0 | Refills: 0 | COMMUNITY

## 2018-03-26 RX ORDER — CHOLECALCIFEROL (VITAMIN D3) 125 MCG
1 CAPSULE ORAL
Qty: 0 | Refills: 0 | COMMUNITY

## 2018-03-26 RX ORDER — VANCOMYCIN HCL 1 G
2000 VIAL (EA) INTRAVENOUS ONCE
Qty: 0 | Refills: 0 | Status: COMPLETED | OUTPATIENT
Start: 2018-03-26 | End: 2018-03-26

## 2018-03-26 RX ORDER — FUROSEMIDE 40 MG
20 TABLET ORAL DAILY
Qty: 0 | Refills: 0 | Status: DISCONTINUED | OUTPATIENT
Start: 2018-03-27 | End: 2018-03-26

## 2018-03-26 RX ORDER — MEROPENEM 1 G/30ML
1000 INJECTION INTRAVENOUS EVERY 8 HOURS
Qty: 0 | Refills: 0 | Status: DISCONTINUED | OUTPATIENT
Start: 2018-03-26 | End: 2018-03-26

## 2018-03-26 RX ORDER — ACYCLOVIR SODIUM 500 MG
400 VIAL (EA) INTRAVENOUS
Qty: 0 | Refills: 0 | COMMUNITY

## 2018-03-26 RX ORDER — SORAFENIB 200 MG/1
2 TABLET, FILM COATED ORAL
Qty: 0 | Refills: 0 | COMMUNITY

## 2018-03-26 RX ORDER — MEROPENEM 1 G/30ML
1000 INJECTION INTRAVENOUS
Qty: 0 | Refills: 0 | COMMUNITY
Start: 2018-03-26

## 2018-03-26 RX ORDER — PANTOPRAZOLE SODIUM 20 MG/1
1 TABLET, DELAYED RELEASE ORAL
Qty: 0 | Refills: 0 | COMMUNITY
Start: 2018-03-26

## 2018-03-26 RX ORDER — CABERGOLINE 0.5 MG/1
1 TABLET ORAL
Qty: 0 | Refills: 0 | COMMUNITY

## 2018-03-26 RX ORDER — SACUBITRIL AND VALSARTAN 24; 26 MG/1; MG/1
1 TABLET, FILM COATED ORAL
Qty: 0 | Refills: 0 | COMMUNITY
Start: 2018-03-26

## 2018-03-26 RX ORDER — ENOXAPARIN SODIUM 100 MG/ML
40 INJECTION SUBCUTANEOUS
Qty: 0 | Refills: 0 | COMMUNITY
Start: 2018-03-26

## 2018-03-26 RX ORDER — VANCOMYCIN HCL 1 G
1250 VIAL (EA) INTRAVENOUS EVERY 12 HOURS
Qty: 0 | Refills: 0 | Status: DISCONTINUED | OUTPATIENT
Start: 2018-03-27 | End: 2018-03-26

## 2018-03-26 RX ORDER — DOCUSATE SODIUM 100 MG
1 CAPSULE ORAL
Qty: 0 | Refills: 0 | COMMUNITY
Start: 2018-03-26

## 2018-03-26 RX ORDER — PANTOPRAZOLE SODIUM 20 MG/1
1 TABLET, DELAYED RELEASE ORAL
Qty: 0 | Refills: 0 | COMMUNITY

## 2018-03-26 RX ORDER — ATORVASTATIN CALCIUM 80 MG/1
1 TABLET, FILM COATED ORAL
Qty: 0 | Refills: 0 | COMMUNITY
Start: 2018-03-26

## 2018-03-26 RX ORDER — ACYCLOVIR SODIUM 500 MG
1 VIAL (EA) INTRAVENOUS
Qty: 0 | Refills: 0 | COMMUNITY
Start: 2018-03-26

## 2018-03-26 RX ORDER — SORAFENIB 200 MG/1
1 TABLET, FILM COATED ORAL
Qty: 0 | Refills: 0 | COMMUNITY
Start: 2018-03-26

## 2018-03-26 RX ORDER — ACETAMINOPHEN 500 MG
2 TABLET ORAL
Qty: 0 | Refills: 0 | COMMUNITY
Start: 2018-03-26

## 2018-03-26 RX ORDER — CHOLECALCIFEROL (VITAMIN D3) 125 MCG
1000 CAPSULE ORAL
Qty: 0 | Refills: 0 | COMMUNITY
Start: 2018-03-26

## 2018-03-26 RX ORDER — POTASSIUM CHLORIDE 20 MEQ
40 PACKET (EA) ORAL ONCE
Qty: 0 | Refills: 0 | Status: COMPLETED | OUTPATIENT
Start: 2018-03-26 | End: 2018-03-26

## 2018-03-26 RX ORDER — CABERGOLINE 0.5 MG/1
1 TABLET ORAL
Qty: 0 | Refills: 0 | COMMUNITY
Start: 2018-03-26

## 2018-03-26 RX ORDER — FUROSEMIDE 40 MG
1 TABLET ORAL
Qty: 0 | Refills: 0 | COMMUNITY

## 2018-03-26 RX ORDER — ZOLPIDEM TARTRATE 10 MG/1
1 TABLET ORAL
Qty: 0 | Refills: 0 | COMMUNITY

## 2018-03-26 RX ORDER — SACUBITRIL AND VALSARTAN 24; 26 MG/1; MG/1
1 TABLET, FILM COATED ORAL
Qty: 0 | Refills: 0 | Status: DISCONTINUED | OUTPATIENT
Start: 2018-03-26 | End: 2018-03-26

## 2018-03-26 RX ADMIN — Medication 400 MILLIGRAM(S): at 17:10

## 2018-03-26 RX ADMIN — POSACONAZOLE 300 MILLIGRAM(S): 100 TABLET, DELAYED RELEASE ORAL at 11:12

## 2018-03-26 RX ADMIN — Medication 650 MILLIGRAM(S): at 05:22

## 2018-03-26 RX ADMIN — Medication 650 MILLIGRAM(S): at 16:34

## 2018-03-26 RX ADMIN — Medication 100 MILLIGRAM(S): at 17:09

## 2018-03-26 RX ADMIN — CEFEPIME 100 MILLIGRAM(S): 1 INJECTION, POWDER, FOR SOLUTION INTRAMUSCULAR; INTRAVENOUS at 05:22

## 2018-03-26 RX ADMIN — Medication 40 MILLIEQUIVALENT(S): at 16:40

## 2018-03-26 RX ADMIN — CEFEPIME 100 MILLIGRAM(S): 1 INJECTION, POWDER, FOR SOLUTION INTRAMUSCULAR; INTRAVENOUS at 13:49

## 2018-03-26 RX ADMIN — Medication 120 MILLIGRAM(S): at 05:22

## 2018-03-26 RX ADMIN — Medication 250 MILLIGRAM(S): at 17:09

## 2018-03-26 RX ADMIN — Medication 1000 UNIT(S): at 13:58

## 2018-03-26 RX ADMIN — PANTOPRAZOLE SODIUM 40 MILLIGRAM(S): 20 TABLET, DELAYED RELEASE ORAL at 05:25

## 2018-03-26 RX ADMIN — ENOXAPARIN SODIUM 40 MILLIGRAM(S): 100 INJECTION SUBCUTANEOUS at 11:12

## 2018-03-26 RX ADMIN — MEROPENEM 100 MILLIGRAM(S): 1 INJECTION INTRAVENOUS at 16:31

## 2018-03-26 RX ADMIN — Medication 400 MILLIGRAM(S): at 05:21

## 2018-03-26 RX ADMIN — Medication 100 MILLIGRAM(S): at 05:24

## 2018-03-26 NOTE — DISCHARGE NOTE ADULT - MEDICATION SUMMARY - MEDICATIONS TO TAKE
I will START or STAY ON the medications listed below when I get home from the hospital:    acetaminophen 325 mg oral tablet  -- 2 tab(s) by mouth every 6 hours, As needed, For Temp greater than 38 C (100.4 F)  -- Indication: For Pain    sacubitril-valsartan 49 mg-51 mg oral tablet  -- 1 tab(s) by mouth 2 times a day  -- Indication: For CHF (congestive heart failure)    dilTIAZem 120 mg/24 hours oral capsule, extended release  -- 1 cap(s) by mouth once a day  -- Indication: For Atrial Fibrillation     enoxaparin  -- 40 unit(s) subcutaneous once a day  -- Indication: For DVT Prophylaxis     Noxafil 100 mg oral delayed release tablet  -- 3 tab(s) by mouth once a day  -- Indication: For Prophylaxis     atorvastatin 40 mg oral tablet  -- 1 tab(s) by mouth once a day (at bedtime)  -- Indication: For HLD    cabergoline 0.5 mg oral tablet  -- 1 tab(s) by mouth once a day  -- Indication: For CNS     acyclovir 400 mg oral tablet  -- 1 tab(s) by mouth 2 times a day  -- Indication: For Prophylaxis     zolpidem 5 mg oral tablet  -- 1 tab(s) by mouth once a day (at bedtime), As needed, Insomnia  -- Indication: For Sleep Aid     meropenem 1000 mg intravenous injection  -- 1000 milligram(s) intravenous every 8 hours  -- Indication: For Neutropenic fever    furosemide 20 mg oral tablet  -- 1 tab(s) by mouth once a day  -- Indication: For CHF (congestive heart failure)    vancomycin  -- 1250 milligram(s) intravenous every 12 hours  -- Indication: For Neutropenic fever    docusate sodium 100 mg oral capsule  -- 1 cap(s) by mouth 2 times a day  -- Indication: For Constipation    Vidaza  -- every 28 days for 7 days. last dose march 6th  -- Indication: For AML (acute myeloblastic leukemia)    pantoprazole 40 mg oral delayed release tablet  -- 1 tab(s) by mouth once a day (before a meal)  -- Indication: For GI Prophylaxis     cholecalciferol oral tablet  -- 1000 unit(s) by mouth once a day  -- Indication: For Prophylaxis

## 2018-03-26 NOTE — DIETITIAN INITIAL EVALUATION ADULT. - PT NOT SOURCE
other (specify)/wife at bedside, pt takes vitamin D3 daily with Boost chocolate milkshake sometimes. NKFA. Neutropenic diet at home with low-salt. Intake is on/off even at home. Has chronic edema on lasix at home. LBM 3/26 but constipation is an ongoing issue.

## 2018-03-26 NOTE — DISCHARGE NOTE ADULT - HOSPITAL COURSE
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 Currently undergoing treatment for neutropenic fever. Over course, patient continued to spike temperatures up to 101-102F. Inititial blood cultures were positive for Streptococcus with second culture positive for gram   Infectious Disease was on board for antibiotic management. Per disucussion with patient and his wife, they were preferring transfer to [facility] for further management of care. Spoke with patient's oncologist, Dr. Syed Salgado at [phone] who agreed to accept transfer. Patient currently switched to Meropenem 1g q8h and Vancomycin loading dose of 2g, once followed by 1250mg q12h. 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 Currently undergoing treatment for neutropenic fever. Over course, patient continued to spike temperatures up to 101-102F. Inititial blood cultures were positive for Streptococcus with second culture positive for gram variable coccobacili. Subsequent blood culture drawn were negative times 1 with repeat blood culture results pending. PICC line was removed and tip sent for culture which was prelimary negative for growth as well. Infectious Disease was on board for antibiotic management. Per discussion with patient and his wife, they were preferring transfer to [facility] for further management of care. Spoke with patient's oncologist, Dr. Syed Salgado at [phone] who agreed to accept transfer. Patient currently switched to Meropenem 1g q8h and Vancomycin loading dose of 2g, once followed by 1250mg q12h. [facility] updated and patient to be transferred

## 2018-03-26 NOTE — PROGRESS NOTE ADULT - ASSESSMENT
Ongoing sepsis.  Repeat BCs have been negative.  In [facility] grew a nutritionally variant Strep which seems to have responded to Vanco.  D/C Dapto.  Vancomycin 2gm iv x once then 1250mg iv q12h.  Change cefepime to Meropenem 1gm iv q8h

## 2018-03-26 NOTE — DISCHARGE NOTE ADULT - PATIENT PORTAL LINK FT
You can access the [facility] Portal, offered by [facility], by registering with the following website: [email or web address]

## 2018-03-26 NOTE — DISCHARGE NOTE ADULT - MEDICATION SUMMARY - MEDICATIONS TO STOP TAKING
I will STOP taking the medications listed below when I get home from the hospital:    SORAfenib 200 mg oral tablet  -- 2 tab(s) by mouth 2 times a day

## 2018-03-26 NOTE — DIETITIAN INITIAL EVALUATION ADULT. - SIGNS/SYMPTOMS
pt reported fluctuating PO  trend and fair appetite. unintentional weight loss of 9% in the last 6 months

## 2018-03-26 NOTE — PROGRESS NOTE ADULT - ASSESSMENT
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 Currently undergoing treatment for neutropenic fever     #) Neutropenic fever and history of AML on chemo  - ID: Cefepime stopped, started on Vancomycin 2g once followed by 1250mg q12h and Meropenem 1 gram q8h   - Blood cultures from peripheral and PICC line prelim negative  - PICC line site clear  - Per discussion today with Dr Corrigan at [facility] ([phone]), plan to stop sorafenib 200mg q12h    - Normal TTE no vegetations    #) h/o Benign pituitary tumor;   - c/w cabergoline 0.5mg qD    #) CAD s/p stent; per pt's wife, not on ASA, plavix and metoprolol according to Cardiologist    #) A fib s/p ablation  - not on AC according to cardiologist  - c/w Cardizem 120mg qD    #) HFpEF (stable)   - Entresto and lasix 20mg qd.    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 Currently undergoing treatment for neutropenic fever     #) Neutropenic fever and history of AML on chemo  - ID: Cefepime stopped, started on Vancomycin 2g once followed by 1250mg q12h and Meropenem 1 gram q8h   - Blood cultures from peripheral and PICC line prelim negative  - PICC line site clear  - Per discussion today with Dr Corrigan at [facility] ([phone]), plan to stop sorafenib 200mg q12h, patient has been approved for transfer to [facility]; Bed is available so he will be   discharged to their facility today   - Normal TTE no vegetations    #) h/o Benign pituitary tumor;   - c/w cabergoline 0.5mg qD    #) CAD s/p stent; per pt's wife, not on ASA, plavix and metoprolol according to Cardiologist    #) A fib s/p ablation  - not on AC according to cardiologist  - c/w Cardizem 120mg qD    #) HFpEF (stable)   - Entresto and lasix 20mg qd.    Constipation; had a bowel movement yesterday, c/w senna, colace, +/- dulcolax    Hypokalemia; F/U repeat level     DVT ppx; Lovenox  GI ppx; Protonix  Dispo: Transfer accepted to [facility], bed available, discharge to their facility today

## 2018-03-26 NOTE — PROGRESS NOTE ADULT - SUBJECTIVE AND OBJECTIVE BOX
JILL JAEEGR  77y, Male      OVERNIGHT EVENTS:    Wife at bedside. Has fevers. Soft BMs. No abdominal pain/nausea/emesis/cough/SOB.    VITALS:  T(F): 99.8, Max: 101 (03-26-18 @ 05:21)  HR: 92  BP: 157/76  RR: 18Vital Signs Last 24 Hrs  T(C): 37.7 (26 Mar 2018 06:39), Max: 38.3 (26 Mar 2018 05:21)  T(F): 99.8 (26 Mar 2018 06:39), Max: 101 (26 Mar 2018 05:21)  HR: 92 (26 Mar 2018 05:21) (78 - 92)  BP: 157/76 (26 Mar 2018 05:21) (147/73 - 158/75)  BP(mean): --  RR: 18 (26 Mar 2018 05:21) (18 - 18)  SpO2: 94% (26 Mar 2018 08:54) (94% - 96%)    TESTS & MEASUREMENTS:                        7.3    0.61  )-----------( 180      ( 26 Mar 2018 09:37 )             22.1     03-26    139  |  103  |  13  ----------------------------<  141<H>  3.1<L>   |  26  |  0.5<L>    Ca    7.7<L>      26 Mar 2018 09:37  Mg     1.8     03-26    TPro  4.9<L>  /  Alb  2.8<L>  /  TBili  0.5  /  DBili  x   /  AST  19  /  ALT  25  /  AlkPhos  57  03-26    LIVER FUNCTIONS - ( 26 Mar 2018 09:37 )  Alb: 2.8 g/dL / Pro: 4.9 g/dL / ALK PHOS: 57 U/L / ALT: 25 U/L / AST: 19 U/L / GGT: x             Culture - Blood (collected 03-22-18 @ 10:12)  Source: .Blood None  Preliminary Report (03-23-18 @ 15:01):    No growth to date.    Culture - Blood (collected 03-22-18 @ 10:00)  Source: .Blood PICC Tip  Preliminary Report (03-23-18 @ 15:02):    No growth to date.    Culture - Urine (collected 03-21-18 @ 16:03)  Source: .Urine Clean Catch (Midstream)  Final Report (03-22-18 @ 22:30):    No growth    Culture - Blood (collected 03-21-18 @ 16:03)  Source: .Blood Blood-Peripheral  Gram Stain (03-22-18 @ 13:53):    Growth in aerobic and anaerobic bottles: Gram Positive Cocci in Pairs and    Chains  Final Report (03-23-18 @ 12:03):    Growth in aerobic and anaerobic bottles: Alpha hemolytic strep    (not Strep. pneumoniae or Enterococcus)    Single set isolate, possible contaminant. Contact    Microbiology if susceptibility testing clinically    indicated.    "Due to technical problems, Proteus sp. will Not be reported as part of    the BCID panel until further notice" ***Blood Panel PCR results on this    specimen are available    approximately 3 hours after the Gram stain result.***    Gram stain, PCR, and/or culture results may not always    correspond due to difference in methodologies.    ************************************************************    This PCR assay was performed using [employer].    The following targets are tested for: Enterococcus,    vancomycin resistant enterococci, Listeria monocytogenes,    coagulase negative staphylococci, S. aureus,    methicillin resistant S. aureus, Streptococcus agalactiae    (Group B), S. pneumoniae, S. pyogenes (Group A),    Acinetobacter baumannii, Enterobacter cloacae, E. coli,    Klebsiella oxytoca, K. pneumoniae, Proteus sp.,    Serratia marcescens, Haemophilus influenzae,    Neisseria meningitidis, Pseudomonas aeruginosa, Candida    albicans, C. glabrata, C krusei, C parapsilosis,    C. tropicalis and the KPC resistance gene.  Organism: Blood Culture PCR (03-23-18 @ 12:03)  Organism: Blood Culture PCR (03-23-18 @ 12:03)      -  Streptococcus sp. (Not Grp A, B or S pneumoniae): Detec      Method Type: PCR    Culture - Blood (collected 03-21-18 @ 16:03)  Source: .Blood Blood-Peripheral  Gram Stain (03-24-18 @ 03:23):    Growth in aerobic bottle: gram variable cocobaclli  Preliminary Report (03-24-18 @ 03:24):    Growth in aerobic bottle: gram varible coccobacilli    "Due to technical problems, Proteus sp. will Not be reported as part of    the BCID panel until further notice"    ***Blood Panel PCR results on this specimen are available    approximately 3 hours after the Gram stain result.***    Gram stain, PCR, and/or culture results may not always    correspond due to difference in methodologies.    ************************************************************    This PCR assay was performed using [employer].    The following targets are tested for: Enterococcus,    vancomycin resistant enterococci, Listeria monocytogenes,    coagulase negative staphylococci, S. aureus,    methicillin resistant S. aureus, Streptococcus agalactiae    (Group B), S. pneumoniae, S. pyogenes (Group A),    Acinetobacter baumannii, Enterobacter cloacae, E. coli,    Klebsiella oxytoca, K. pneumoniae, Proteus sp.,    Serratia marcescens, Haemophilus influenzae,    Neisseria meningitidis, Pseudomonas aeruginosa, Candida    albicans, C. glabrata, C krusei, C parapsilosis,    C. tropicalis and the KPC resistance gene.  Organism: Blood Culture PCR (03-24-18 @ 03:26)  Organism: Blood Culture PCR (03-24-18 @ 03:26)      -  Acinetobacter baumanii: Nondet      -  Candida albicans: Nondet      -  Candida glabrata: Nondet      -  Candida krusei: Nondet      -  Candida parapsilosis: Nondet      -  Candida tropicalis: Nondet      -  Coagulase negative Staphylococcus: Nondet      -  Enterobacter cloacae complex: Nondet      -  Enterococcus species: Nondet      -  Escherichia coli: Nondet      -  Haemophilus influenzae: Nondet      -  Klebsiella oxytoca: Nondet      -  Klebsiella pneumoniae: Nondet      -  Listeria monocytogenes: Nondet      -  Methicillin resistant Staphylococcus aureus (MRSA): Nondet      -  Multidrug (KPC pos) resistant organism: Nondet      -  Neisseria meningitidis: Nondet      -  Pseudomonas aeruginosa: Nondet      -  Serratia marcescens: Nondet      -  Staphylococcus aureus: Nondet      -  Streptococcus agalactiae (Group B): Nondet      -  Streptococcus pneumoniae: Nondet      -  Streptococcus pyogenes (Group A): Nondet      -  Streptococcus sp. (Not Grp A, B or S pneumoniae): Nondet      -  Vancomycin resistant Enterococcus sp.: Nondet      Method Type: PCR            RADIOLOGY & ADDITIONAL TESTS:    ANTIBIOTICS:  acyclovir   Tablet 400 milliGRAM(s) Oral two times a day  cefepime  IVPB 2000 milliGRAM(s) IV Intermittent every 8 hours  posaconazole DR Tablet 300 milliGRAM(s) Oral daily

## 2018-03-26 NOTE — PROGRESS NOTE ADULT - ASSESSMENT
A/P:   1. Neutropenic fever:   2. Bacteremia: Blood culture grew Streptococcus species (Not A, B or C).   He is still with fever. WBC: 0.61, ANC 10  Daptomycin and Cefepime discontinued, ID recommended to start on Vancomycin and Meropenem.   PICC line removed.   Echo showed normal LVEF, no vegetation.   C.diff negative.     3. AML: s/p recent chemotherapy.  Hold Sorafenib for now as per Oncology.     4. CAD: stable s/p PCI  Not on ASA and Plavix, continue Metoprolol .     5. AFIb: stable  Continue Cardizem    DVT PPX: on Lovenox.

## 2018-03-26 NOTE — DISCHARGE NOTE ADULT - PLAN OF CARE
Monitor and treat adequately Patient on antibiotic therapy with infectious disease following. To be transferred to [facility] on Vancomycin 1250mg q12h and Meropenem 1 gram q8h (start date 3/26/2018)

## 2018-03-26 NOTE — DIETITIAN INITIAL EVALUATION ADULT. - NS FNS SUPPLEMENTS
Ensure Enlive®/Chocolate Ensure Enlive q24hr, Ensure Pudding chocolate q24hr. C/w current Neutropenic diet with DASH/TLC/Ensure pudding®

## 2018-03-26 NOTE — DISCHARGE NOTE ADULT - CARE PLAN
Principal Discharge DX:	Neutropenic fever  Goal:	Monitor and treat adequately  Assessment and plan of treatment:	Patient on antibiotic therapy with infectious disease following. To be transferred to [facility] on Vancomycin 1250mg q12h and Meropenem 1 gram q8h (start date 3/26/2018)

## 2018-03-26 NOTE — DIETITIAN INITIAL EVALUATION ADULT. - SOURCE
family/significant other/other (specify)/patient/ongoing sepsis, c/w abx, pending d/c? When patient was at [facility], pt grew nutritionally variant strep. Currently no fever. PICC removed. C diff negative. However, pt reported ongoing fever of 101 this morning

## 2018-03-26 NOTE — PROGRESS NOTE ADULT - SUBJECTIVE AND OBJECTIVE BOX
JILL JAEGER  77y  Male      Patient is a 77y old  Male who presents with a chief complaint of neutropenic fever (21 Mar 2018 22:16)      INTERVAL HPI/OVERNIGHT EVENTS:  patient still with fever.     Vital Signs Last 24 Hrs  T(C): 38.3 (26 Mar 2018 16:30), Max: 38.3 (26 Mar 2018 05:21)  T(F): 100.9 (26 Mar 2018 16:30), Max: 101 (26 Mar 2018 05:21)  HR: 89 (26 Mar 2018 14:32) (84 - 92)  BP: 150/74 (26 Mar 2018 14:32) (150/74 - 158/75)  BP(mean): --  RR: 18 (26 Mar 2018 14:32) (18 - 18)  SpO2: 94% (26 Mar 2018 08:54) (94% - 96%)      03-26-18 @ 07:01  -  03-26-18 @ 17:00  --------------------------------------------------------  IN: 540 mL / OUT: 0 mL / NET: 540 mL            Consultant(s) Notes Reviewed:  [x ] YES  [ ] NO          MEDICATIONS  (STANDING):  acyclovir   Tablet 400 milliGRAM(s) Oral two times a day  atorvastatin 40 milliGRAM(s) Oral at bedtime  cabergoline 0.5 milliGRAM(s) Oral daily  cholecalciferol 1000 Unit(s) Oral daily  diltiazem    milliGRAM(s) Oral daily  docusate sodium 100 milliGRAM(s) Oral two times a day  enoxaparin Injectable 40 milliGRAM(s) SubCutaneous every 24 hours  meropenem  IVPB 1000 milliGRAM(s) IV Intermittent every 8 hours  pantoprazole    Tablet 40 milliGRAM(s) Oral before breakfast  posaconazole DR Tablet 300 milliGRAM(s) Oral daily  sacubitril 49 mG/valsartan 51 mG 1 Tablet(s) Oral two times a day  SORAfenib 200 milliGRAM(s) Oral two times a day  vancomycin  IVPB 2000 milliGRAM(s) IV Intermittent once    MEDICATIONS  (PRN):  acetaminophen   Tablet 650 milliGRAM(s) Oral every 6 hours PRN For Temp greater than 38 C (100.4 F)  zolpidem 5 milliGRAM(s) Oral at bedtime PRN Insomnia      LABS                          7.3    0.61  )-----------( 180      ( 26 Mar 2018 09:37 )             22.1     03-26    139  |  103  |  13  ----------------------------<  141<H>  3.1<L>   |  26  |  0.5<L>    Ca    7.7<L>      26 Mar 2018 09:37  Mg     1.8     03-26    TPro  4.9<L>  /  Alb  2.8<L>  /  TBili  0.5  /  DBili  x   /  AST  19  /  ALT  25  /  AlkPhos  57  03-26          Lactate Trend        CAPILLARY BLOOD GLUCOSE          Culture - Blood (collected 03-25-18 @ 07:54)  Source: .Blood None  Preliminary Report (03-26-18 @ 15:00):    No growth to date.    Culture - Blood (collected 03-22-18 @ 10:12)  Source: .Blood None  Preliminary Report (03-23-18 @ 15:01):    No growth to date.    Culture - Blood (collected 03-22-18 @ 10:00)  Source: .Blood PICC Tip  Preliminary Report (03-23-18 @ 15:02):    No growth to date.    Culture - Urine (collected 03-21-18 @ 16:03)  Source: .Urine Clean Catch (Midstream)  Final Report (03-22-18 @ 22:30):    No growth    Culture - Blood (collected 03-21-18 @ 16:03)  Source: .Blood Blood-Peripheral  Gram Stain (03-22-18 @ 13:53):    Growth in aerobic and anaerobic bottles: Gram Positive Cocci in Pairs and    Chains  Final Report (03-23-18 @ 12:03):    Growth in aerobic and anaerobic bottles: Alpha hemolytic strep    (not Strep. pneumoniae or Enterococcus)    Single set isolate, possible contaminant. Contact    Microbiology if susceptibility testing clinically    indicated.    "Due to technical problems, Proteus sp. will Not be reported as part of    the BCID panel until further notice" ***Blood Panel PCR results on this    specimen are available    approximately 3 hours after the Gram stain result.***    Gram stain, PCR, and/or culture results may not always    correspond due to difference in methodologies.    ************************************************************    This PCR assay was performed using [employer].    The following targets are tested for: Enterococcus,    vancomycin resistant enterococci, Listeria monocytogenes,    coagulase negative staphylococci, S. aureus,    methicillin resistant S. aureus, Streptococcus agalactiae    (Group B), S. pneumoniae, S. pyogenes (Group A),    Acinetobacter baumannii, Enterobacter cloacae, E. coli,    Klebsiella oxytoca, K. pneumoniae, Proteus sp.,    Serratia marcescens, Haemophilus influenzae,    Neisseria meningitidis, Pseudomonas aeruginosa, Candida    albicans, C. glabrata, C krusei, C parapsilosis,    C. tropicalis and the KPC resistance gene.  Organism: Blood Culture PCR (03-23-18 @ 12:03)  Organism: Blood Culture PCR (03-23-18 @ 12:03)      -  Streptococcus sp. (Not Grp A, B or S pneumoniae): Detec      Method Type: PCR    Culture - Blood (collected 03-21-18 @ 16:03)  Source: .Blood Blood-Peripheral  Gram Stain (03-24-18 @ 03:23):    Growth in aerobic bottle: gram variable cocobaclli  Preliminary Report (03-24-18 @ 03:24):    Growth in aerobic bottle: gram varible coccobacilli    "Due to technical problems, Proteus sp. will Not be reported as part of    the BCID panel until further notice"    ***Blood Panel PCR results on this specimen are available    approximately 3 hours after the Gram stain result.***    Gram stain, PCR, and/or culture results may not always    correspond due to difference in methodologies.    ************************************************************    This PCR assay was performed using [employer].    The following targets are tested for: Enterococcus,    vancomycin resistant enterococci, Listeria monocytogenes,    coagulase negative staphylococci, S. aureus,    methicillin resistant S. aureus, Streptococcus agalactiae    (Group B), S. pneumoniae, S. pyogenes (Group A),    Acinetobacter baumannii, Enterobacter cloacae, E. coli,    Klebsiella oxytoca, K. pneumoniae, Proteus sp.,    Serratia marcescens, Haemophilus influenzae,    Neisseria meningitidis, Pseudomonas aeruginosa, Candida    albicans, C. glabrata, C krusei, C parapsilosis,    C. tropicalis and the KPC resistance gene.  Organism: Blood Culture PCR (03-24-18 @ 03:26)  Organism: Blood Culture PCR (03-24-18 @ 03:26)      -  Acinetobacter baumanii: Nondet      -  Candida albicans: Nondet      -  Candida glabrata: Nondet      -  Candida krusei: Nondet      -  Candida parapsilosis: Nondet      -  Candida tropicalis: Nondet      -  Coagulase negative Staphylococcus: Nondet      -  Enterobacter cloacae complex: Nondet      -  Enterococcus species: Nondet      -  Escherichia coli: Nondet      -  Haemophilus influenzae: Nondet      -  Klebsiella oxytoca: Nondet      -  Klebsiella pneumoniae: Nondet      -  Listeria monocytogenes: Nondet      -  Methicillin resistant Staphylococcus aureus (MRSA): Nondet      -  Multidrug (KPC pos) resistant organism: Nondet      -  Neisseria meningitidis: Nondet      -  Pseudomonas aeruginosa: Nondet      -  Serratia marcescens: Nondet      -  Staphylococcus aureus: Nondet      -  Streptococcus agalactiae (Group B): Nondet      -  Streptococcus pneumoniae: Nondet      -  Streptococcus pyogenes (Group A): Nondet      -  Streptococcus sp. (Not Grp A, B or S pneumoniae): Nondet      -  Vancomycin resistant Enterococcus sp.: Nondet      Method Type: PCR        RADIOLOGY & ADDITIONAL TESTS:    Imaging Personally Reviewed:  [ ] YES  [ ] NO    HEALTH ISSUES - PROBLEM Dx:        Physical exam:   Alert, oriented x3, in no distress  HEENT: PERRLA, dry nasal mucosa with crusted mucosa.   Neck: no JVD, no LAD  Chest; clear, no rales or wheezing  Heart: RRR s1 s2, no murmurs  Abdomen: soft, non-tender. no edema  Ext;  LE normal, pulses are ok b/l.

## 2018-03-27 LAB
CULTURE RESULTS: SIGNIFICANT CHANGE UP
CULTURE RESULTS: SIGNIFICANT CHANGE UP
SPECIMEN SOURCE: SIGNIFICANT CHANGE UP
SPECIMEN SOURCE: SIGNIFICANT CHANGE UP

## 2018-03-27 RX ORDER — VANCOMYCIN HCL 1 G
1250 VIAL (EA) INTRAVENOUS
Qty: 0 | Refills: 0 | COMMUNITY
Start: 2018-03-27

## 2018-03-28 LAB
-  CEFTRIAXONE: SIGNIFICANT CHANGE UP
-  CLINDAMYCIN: SIGNIFICANT CHANGE UP
-  ERYTHROMYCIN: SIGNIFICANT CHANGE UP
-  PENICILLIN: SIGNIFICANT CHANGE UP
-  VANCOMYCIN: SIGNIFICANT CHANGE UP
CULTURE RESULTS: SIGNIFICANT CHANGE UP
CULTURE RESULTS: SIGNIFICANT CHANGE UP
METHOD TYPE: SIGNIFICANT CHANGE UP
METHOD TYPE: SIGNIFICANT CHANGE UP
ORGANISM # SPEC MICROSCOPIC CNT: SIGNIFICANT CHANGE UP
SPECIMEN SOURCE: SIGNIFICANT CHANGE UP

## 2018-03-29 DIAGNOSIS — D70.1 AGRANULOCYTOSIS SECONDARY TO CANCER CHEMOTHERAPY: ICD-10-CM

## 2018-03-29 DIAGNOSIS — E87.6 HYPOKALEMIA: ICD-10-CM

## 2018-03-29 DIAGNOSIS — A40.9 STREPTOCOCCAL SEPSIS, UNSPECIFIED: ICD-10-CM

## 2018-03-29 DIAGNOSIS — I50.32 CHRONIC DIASTOLIC (CONGESTIVE) HEART FAILURE: ICD-10-CM

## 2018-03-29 DIAGNOSIS — T45.1X5A ADVERSE EFFECT OF ANTINEOPLASTIC AND IMMUNOSUPPRESSIVE DRUGS, INITIAL ENCOUNTER: ICD-10-CM

## 2018-03-29 DIAGNOSIS — B95.4 OTHER STREPTOCOCCUS AS THE CAUSE OF DISEASES CLASSIFIED ELSEWHERE: ICD-10-CM

## 2018-03-29 DIAGNOSIS — D70.3 NEUTROPENIA DUE TO INFECTION: ICD-10-CM

## 2018-03-29 DIAGNOSIS — K59.00 CONSTIPATION, UNSPECIFIED: ICD-10-CM

## 2018-03-29 DIAGNOSIS — R78.81 BACTEREMIA: ICD-10-CM

## 2018-03-29 DIAGNOSIS — I11.0 HYPERTENSIVE HEART DISEASE WITH HEART FAILURE: ICD-10-CM

## 2018-03-29 DIAGNOSIS — I48.2 CHRONIC ATRIAL FIBRILLATION: ICD-10-CM

## 2018-03-29 DIAGNOSIS — C92.00 ACUTE MYELOBLASTIC LEUKEMIA, NOT HAVING ACHIEVED REMISSION: ICD-10-CM

## 2018-03-29 DIAGNOSIS — R50.81 FEVER PRESENTING WITH CONDITIONS CLASSIFIED ELSEWHERE: ICD-10-CM

## 2018-03-29 DIAGNOSIS — Z96.652 PRESENCE OF LEFT ARTIFICIAL KNEE JOINT: ICD-10-CM

## 2018-03-29 DIAGNOSIS — I25.10 ATHEROSCLEROTIC HEART DISEASE OF NATIVE CORONARY ARTERY WITHOUT ANGINA PECTORIS: ICD-10-CM

## 2018-03-30 LAB
CULTURE RESULTS: SIGNIFICANT CHANGE UP
SPECIMEN SOURCE: SIGNIFICANT CHANGE UP

## 2018-04-05 DIAGNOSIS — T80.29XA INFECTION FOLLOWING OTHER INFUSION, TRANSFUSION AND THERAPEUTIC INJECTION, INITIAL ENCOUNTER: ICD-10-CM

## 2018-04-05 DIAGNOSIS — A41.9 SEPSIS, UNSPECIFIED ORGANISM: ICD-10-CM

## 2018-10-16 ENCOUNTER — OUTPATIENT (OUTPATIENT)
Dept: OUTPATIENT SERVICES | Facility: HOSPITAL | Age: 78
LOS: 1 days | Discharge: HOME | End: 2018-10-16

## 2018-10-16 DIAGNOSIS — Z96.652 PRESENCE OF LEFT ARTIFICIAL KNEE JOINT: Chronic | ICD-10-CM

## 2018-10-16 DIAGNOSIS — I48.91 UNSPECIFIED ATRIAL FIBRILLATION: ICD-10-CM

## 2018-10-16 DIAGNOSIS — D64.9 ANEMIA, UNSPECIFIED: ICD-10-CM

## 2018-10-16 DIAGNOSIS — C92.00 ACUTE MYELOBLASTIC LEUKEMIA, NOT HAVING ACHIEVED REMISSION: ICD-10-CM

## 2018-10-16 DIAGNOSIS — Z98.890 OTHER SPECIFIED POSTPROCEDURAL STATES: Chronic | ICD-10-CM

## 2018-10-16 DIAGNOSIS — E78.5 HYPERLIPIDEMIA, UNSPECIFIED: ICD-10-CM

## 2018-10-16 PROBLEM — I25.10 ATHEROSCLEROTIC HEART DISEASE OF NATIVE CORONARY ARTERY WITHOUT ANGINA PECTORIS: Chronic | Status: ACTIVE | Noted: 2018-03-21

## 2018-10-16 PROBLEM — D49.7 NEOPLASM OF UNSPECIFIED BEHAVIOR OF ENDOCRINE GLANDS AND OTHER PARTS OF NERVOUS SYSTEM: Chronic | Status: ACTIVE | Noted: 2018-03-22

## 2018-10-16 PROBLEM — I50.9 HEART FAILURE, UNSPECIFIED: Chronic | Status: ACTIVE | Noted: 2018-03-21

## 2018-10-16 PROBLEM — I10 ESSENTIAL (PRIMARY) HYPERTENSION: Chronic | Status: ACTIVE | Noted: 2018-03-21

## 2021-04-01 NOTE — PATIENT PROFILE ADULT. - NS PRO PT RIGHT SUPPORT PERSON
Declines Protopic Counseling: Patient may experience a mild burning sensation during topical application. Protopic is not approved in children less than 2 years of age. There have been case reports of hematologic and skin malignancies in patients using topical calcineurin inhibitors although causality is questionable.

## 2024-05-17 NOTE — PATIENT PROFILE ADULT. - FUNCTIONAL LEVEL PRIOR: TOILETING
plan of care explained/side rails up/wait time explained
ambulated to x-ray/ambulated to bathroom
(2) assistive person